# Patient Record
Sex: MALE | Race: WHITE | NOT HISPANIC OR LATINO | Employment: OTHER | URBAN - METROPOLITAN AREA
[De-identification: names, ages, dates, MRNs, and addresses within clinical notes are randomized per-mention and may not be internally consistent; named-entity substitution may affect disease eponyms.]

---

## 2017-06-05 ENCOUNTER — HOSPITAL ENCOUNTER (OUTPATIENT)
Dept: FAMILY MEDICINE CLINIC | Facility: CLINIC | Age: 62
Setting detail: SPECIMEN
Discharge: HOME | End: 2017-06-05
Attending: FAMILY MEDICINE | Admitting: FAMILY MEDICINE

## 2017-06-05 LAB
ALBUMIN SERPL-MCNC: 4.2 G/DL (ref 3.5–4.8)
ALBUMIN/GLOB SERPL: 1.2 {RATIO} (ref 1–1.7)
ALP SERPL-CCNC: 75 IU/L (ref 32–91)
ALT SERPL-CCNC: 26 IU/L (ref 17–63)
ANION GAP SERPL CALC-SCNC: 15.1 MMOL/L (ref 10–20)
AST SERPL-CCNC: 21 IU/L (ref 15–41)
BILIRUB SERPL-MCNC: 0.5 MG/DL (ref 0.3–1.2)
BUN SERPL-MCNC: 18 MG/DL (ref 8–20)
BUN/CREAT SERPL: 15 (ref 6.2–20.3)
CALCIUM SERPL-MCNC: 10.8 MG/DL (ref 8.9–10.3)
CHLORIDE SERPL-SCNC: 102 MMOL/L (ref 101–111)
CHOLEST SERPL-MCNC: 154 MG/DL
CHOLEST/HDLC SERPL: 2.6 {RATIO}
CONV CO2: 24 MMOL/L (ref 22–32)
CONV LDL CHOLESTEROL DIRECT: 77 MG/DL (ref 0–100)
CONV TOTAL PROTEIN: 7.6 G/DL (ref 6.1–7.9)
CREAT UR-MCNC: 1.2 MG/DL (ref 0.7–1.2)
GLOBULIN UR ELPH-MCNC: 3.4 G/DL (ref 2.5–3.8)
GLUCOSE SERPL-MCNC: 121 MG/DL (ref 65–99)
HDLC SERPL-MCNC: 59 MG/DL
LDLC/HDLC SERPL: 1.3 {RATIO}
LIPID INTERPRETATION: NORMAL
POTASSIUM SERPL-SCNC: 4.1 MMOL/L (ref 3.6–5.1)
SODIUM SERPL-SCNC: 137 MMOL/L (ref 136–144)
TRIGL SERPL-MCNC: 129 MG/DL
VLDLC SERPL CALC-MCNC: 18.7 MG/DL

## 2017-12-01 ENCOUNTER — HOSPITAL ENCOUNTER (OUTPATIENT)
Dept: FAMILY MEDICINE CLINIC | Facility: CLINIC | Age: 62
Setting detail: SPECIMEN
Discharge: HOME | End: 2017-12-01
Attending: FAMILY MEDICINE | Admitting: FAMILY MEDICINE

## 2017-12-01 LAB
ALBUMIN SERPL-MCNC: 4.3 G/DL (ref 3.5–4.8)
ALBUMIN/GLOB SERPL: 1.4 {RATIO} (ref 1–1.7)
ALP SERPL-CCNC: 87 IU/L (ref 32–91)
ALT SERPL-CCNC: 27 IU/L (ref 17–63)
ANION GAP SERPL CALC-SCNC: 13.4 MMOL/L (ref 10–20)
AST SERPL-CCNC: 26 IU/L (ref 15–41)
BASOPHILS # BLD AUTO: 0.1 10*3/UL (ref 0–0.2)
BASOPHILS NFR BLD AUTO: 1 % (ref 0–2)
BILIRUB SERPL-MCNC: 0.7 MG/DL (ref 0.3–1.2)
BUN SERPL-MCNC: 19 MG/DL (ref 8–20)
BUN/CREAT SERPL: 14.6 (ref 6.2–20.3)
CALCIUM SERPL-MCNC: 9.7 MG/DL (ref 8.9–10.3)
CHLORIDE SERPL-SCNC: 100 MMOL/L (ref 101–111)
CHOLEST SERPL-MCNC: 143 MG/DL
CHOLEST/HDLC SERPL: 2.6 {RATIO}
CONV CO2: 23 MMOL/L (ref 22–32)
CONV LDL CHOLESTEROL DIRECT: 74 MG/DL (ref 0–100)
CONV TOTAL PROTEIN: 7.4 G/DL (ref 6.1–7.9)
CREAT UR-MCNC: 1.3 MG/DL (ref 0.7–1.2)
DIFFERENTIAL METHOD BLD: (no result)
EOSINOPHIL # BLD AUTO: 0.1 10*3/UL (ref 0–0.3)
EOSINOPHIL # BLD AUTO: 1 % (ref 0–3)
ERYTHROCYTE [DISTWIDTH] IN BLOOD BY AUTOMATED COUNT: 15.4 % (ref 11.5–14.5)
GLOBULIN UR ELPH-MCNC: 3.1 G/DL (ref 2.5–3.8)
GLUCOSE SERPL-MCNC: 95 MG/DL (ref 65–99)
HCT VFR BLD AUTO: 41.2 % (ref 40–54)
HDLC SERPL-MCNC: 54 MG/DL
HGB BLD-MCNC: 13.4 G/DL (ref 14–18)
LDLC/HDLC SERPL: 1.4 {RATIO}
LIPID INTERPRETATION: NORMAL
LYMPHOCYTES # BLD AUTO: 2 10*3/UL (ref 0.8–4.8)
LYMPHOCYTES NFR BLD AUTO: 15 % (ref 18–42)
MCH RBC QN AUTO: 28.1 PG (ref 26–32)
MCHC RBC AUTO-ENTMCNC: 32.4 G/DL (ref 32–36)
MCV RBC AUTO: 86.5 FL (ref 80–94)
MONOCYTES # BLD AUTO: 1 10*3/UL (ref 0.1–1.3)
MONOCYTES NFR BLD AUTO: 8 % (ref 2–11)
NEUTROPHILS # BLD AUTO: 9.7 10*3/UL (ref 2.3–8.6)
NEUTROPHILS NFR BLD AUTO: 75 % (ref 50–75)
NRBC BLD AUTO-RTO: 0 /100{WBCS}
NRBC/RBC NFR BLD MANUAL: 0 10*3/UL
PHOSPHATE SERPL-MCNC: 3.1 MG/DL (ref 2.4–4.7)
PLATELET # BLD AUTO: 309 10*3/UL (ref 150–450)
PMV BLD AUTO: 8.7 FL (ref 7.4–10.4)
POTASSIUM SERPL-SCNC: 4.4 MMOL/L (ref 3.6–5.1)
PSA SERPL-MCNC: 2.14 NG/ML (ref 0–4)
RBC # BLD AUTO: 4.77 10*6/UL (ref 4.6–6)
SODIUM SERPL-SCNC: 132 MMOL/L (ref 136–144)
TRIGL SERPL-MCNC: 80 MG/DL
VLDLC SERPL CALC-MCNC: 14.8 MG/DL
WBC # BLD AUTO: 12.9 10*3/UL (ref 4.5–11.5)

## 2018-06-01 ENCOUNTER — HOSPITAL ENCOUNTER (OUTPATIENT)
Dept: FAMILY MEDICINE CLINIC | Facility: CLINIC | Age: 63
Setting detail: SPECIMEN
Discharge: HOME | End: 2018-06-01
Attending: FAMILY MEDICINE | Admitting: FAMILY MEDICINE

## 2018-06-01 LAB
ALBUMIN SERPL-MCNC: 4.2 G/DL (ref 3.5–4.8)
ALBUMIN/GLOB SERPL: 1.3 {RATIO} (ref 1–1.7)
ALP SERPL-CCNC: 97 IU/L (ref 32–91)
ALT SERPL-CCNC: 25 IU/L (ref 17–63)
ANION GAP SERPL CALC-SCNC: 16.2 MMOL/L (ref 10–20)
AST SERPL-CCNC: 25 IU/L (ref 15–41)
BASOPHILS # BLD AUTO: 0.1 10*3/UL (ref 0–0.2)
BASOPHILS NFR BLD AUTO: 1 % (ref 0–2)
BILIRUB SERPL-MCNC: 0.9 MG/DL (ref 0.3–1.2)
BILIRUB UR QL STRIP: NEGATIVE MG/DL
BUN SERPL-MCNC: 18 MG/DL (ref 8–20)
BUN/CREAT SERPL: 12.9 (ref 6.2–20.3)
CALCIUM SERPL-MCNC: 9.9 MG/DL (ref 8.9–10.3)
CASTS URNS QL MICRO: ABNORMAL /[LPF]
CHLORIDE SERPL-SCNC: 100 MMOL/L (ref 101–111)
CHOLEST SERPL-MCNC: 157 MG/DL
CHOLEST/HDLC SERPL: 3 {RATIO}
COLOR UR: YELLOW
CONV BACTERIA IN URINE MICRO: NEGATIVE
CONV CLARITY OF URINE: CLEAR
CONV CO2: 22 MMOL/L (ref 22–32)
CONV HYALINE CASTS IN URINE MICRO: 4 /[LPF] (ref 0–5)
CONV LDL CHOLESTEROL DIRECT: 83 MG/DL (ref 0–100)
CONV MICROALBUM.,U,RANDOM: 8 MG/L
CONV PROTEIN IN URINE BY AUTOMATED TEST STRIP: NEGATIVE MG/DL
CONV SMALL ROUND CELLS: ABNORMAL /[HPF]
CONV TOTAL PROTEIN: 7.4 G/DL (ref 6.1–7.9)
CONV UROBILINOGEN IN URINE BY AUTOMATED TEST STRIP: 0.2 MG/DL
CREAT 24H UR-MCNC: 249.9 MG/DL
CREAT UR-MCNC: 1.4 MG/DL (ref 0.7–1.2)
CULTURE INDICATED?: ABNORMAL
DIFFERENTIAL METHOD BLD: (no result)
EOSINOPHIL # BLD AUTO: 0.1 10*3/UL (ref 0–0.3)
EOSINOPHIL # BLD AUTO: 1 % (ref 0–3)
ERYTHROCYTE [DISTWIDTH] IN BLOOD BY AUTOMATED COUNT: 15.2 % (ref 11.5–14.5)
GLOBULIN UR ELPH-MCNC: 3.2 G/DL (ref 2.5–3.8)
GLUCOSE SERPL-MCNC: 116 MG/DL (ref 65–99)
GLUCOSE UR QL: NEGATIVE MG/DL
HCT VFR BLD AUTO: 44.8 % (ref 40–54)
HDLC SERPL-MCNC: 53 MG/DL
HGB BLD-MCNC: 14.6 G/DL (ref 14–18)
HGB UR QL STRIP: NEGATIVE
KETONES UR QL STRIP: NEGATIVE MG/DL
LDLC/HDLC SERPL: 1.6 {RATIO}
LEUKOCYTE ESTERASE UR QL STRIP: ABNORMAL
LIPID INTERPRETATION: ABNORMAL
LYMPHOCYTES # BLD AUTO: 1.7 10*3/UL (ref 0.8–4.8)
LYMPHOCYTES NFR BLD AUTO: 15 % (ref 18–42)
MCH RBC QN AUTO: 27.8 PG (ref 26–32)
MCHC RBC AUTO-ENTMCNC: 32.6 G/DL (ref 32–36)
MCV RBC AUTO: 85.2 FL (ref 80–94)
MICROALBUMIN/CREAT UR: 3.2 UG/MG
MONOCYTES # BLD AUTO: 1 10*3/UL (ref 0.1–1.3)
MONOCYTES NFR BLD AUTO: 9 % (ref 2–11)
NEUTROPHILS # BLD AUTO: 8.7 10*3/UL (ref 2.3–8.6)
NEUTROPHILS NFR BLD AUTO: 74 % (ref 50–75)
NITRITE UR QL STRIP: NEGATIVE
NRBC BLD AUTO-RTO: 0 /100{WBCS}
NRBC/RBC NFR BLD MANUAL: 0 10*3/UL
PH UR STRIP.AUTO: 6.5 [PH] (ref 4.5–8)
PLATELET # BLD AUTO: 308 10*3/UL (ref 150–450)
PMV BLD AUTO: 8.9 FL (ref 7.4–10.4)
POTASSIUM SERPL-SCNC: 4.2 MMOL/L (ref 3.6–5.1)
RBC # BLD AUTO: 5.26 10*6/UL (ref 4.6–6)
RBC #/AREA URNS HPF: 1 /[HPF] (ref 0–3)
SODIUM SERPL-SCNC: 134 MMOL/L (ref 136–144)
SP GR UR: 1.02 (ref 1–1.03)
SPERM URNS QL MICRO: ABNORMAL /[HPF]
SQUAMOUS SPT QL MICRO: 1 /[HPF] (ref 0–5)
TRIGL SERPL-MCNC: 109 MG/DL
UNIDENT CRYS URNS QL MICRO: ABNORMAL /[HPF]
VLDLC SERPL CALC-MCNC: 21.2 MG/DL
WBC # BLD AUTO: 11.5 10*3/UL (ref 4.5–11.5)
WBC #/AREA URNS HPF: 3 /[HPF] (ref 0–5)
YEAST SPEC QL WET PREP: ABNORMAL /[HPF]

## 2018-06-12 ENCOUNTER — HOSPITAL ENCOUNTER (OUTPATIENT)
Dept: CARDIOLOGY | Facility: HOSPITAL | Age: 63
Discharge: HOME | End: 2018-06-12
Attending: SURGERY | Admitting: SURGERY

## 2018-07-17 ENCOUNTER — HOSPITAL ENCOUNTER (OUTPATIENT)
Dept: OTHER | Facility: HOSPITAL | Age: 63
Discharge: HOME | End: 2018-07-17
Attending: SURGERY | Admitting: SURGERY

## 2018-07-17 LAB
ANION GAP SERPL CALC-SCNC: 12 MMOL/L (ref 10–20)
BACTERIA SPEC AEROBE CULT: NORMAL
BASOPHILS # BLD AUTO: 0 10*3/UL (ref 0–0.2)
BASOPHILS NFR BLD AUTO: 1 % (ref 0–2)
BUN SERPL-MCNC: 11 MG/DL (ref 8–20)
BUN/CREAT SERPL: 9.2 (ref 6.2–20.3)
CALCIUM SERPL-MCNC: 9.5 MG/DL (ref 8.9–10.3)
CHLORIDE SERPL-SCNC: 103 MMOL/L (ref 101–111)
CONV CO2: 24 MMOL/L (ref 22–32)
CREAT UR-MCNC: 1.2 MG/DL (ref 0.7–1.2)
DIFFERENTIAL METHOD BLD: (no result)
EOSINOPHIL # BLD AUTO: 0.2 10*3/UL (ref 0–0.3)
EOSINOPHIL # BLD AUTO: 2 % (ref 0–3)
ERYTHROCYTE [DISTWIDTH] IN BLOOD BY AUTOMATED COUNT: 15.3 % (ref 11.5–14.5)
GLUCOSE SERPL-MCNC: 100 MG/DL (ref 65–99)
HCT VFR BLD AUTO: 42.8 % (ref 40–54)
HGB BLD-MCNC: 13.9 G/DL (ref 14–18)
LYMPHOCYTES # BLD AUTO: 1.7 10*3/UL (ref 0.8–4.8)
LYMPHOCYTES NFR BLD AUTO: 15 % (ref 18–42)
Lab: NORMAL
MCH RBC QN AUTO: 27.6 PG (ref 26–32)
MCHC RBC AUTO-ENTMCNC: 32.6 G/DL (ref 32–36)
MCV RBC AUTO: 84.9 FL (ref 80–94)
MICRO REPORT STATUS: NORMAL
MONOCYTES # BLD AUTO: 1.1 10*3/UL (ref 0.1–1.3)
MONOCYTES NFR BLD AUTO: 10 % (ref 2–11)
NEUTROPHILS # BLD AUTO: 7.9 10*3/UL (ref 2.3–8.6)
NEUTROPHILS NFR BLD AUTO: 72 % (ref 50–75)
NRBC BLD AUTO-RTO: 0 /100{WBCS}
NRBC/RBC NFR BLD MANUAL: 0 10*3/UL
PLATELET # BLD AUTO: 307 10*3/UL (ref 150–450)
PMV BLD AUTO: 8.4 FL (ref 7.4–10.4)
POTASSIUM SERPL-SCNC: 4 MMOL/L (ref 3.6–5.1)
RBC # BLD AUTO: 5.04 10*6/UL (ref 4.6–6)
SODIUM SERPL-SCNC: 135 MMOL/L (ref 136–144)
SPECIMEN SOURCE: NORMAL
WBC # BLD AUTO: 10.8 10*3/UL (ref 4.5–11.5)

## 2018-07-19 ENCOUNTER — HOSPITAL ENCOUNTER (OUTPATIENT)
Dept: PREOP | Facility: HOSPITAL | Age: 63
Setting detail: HOSPITAL OUTPATIENT SURGERY
Discharge: HOME | End: 2018-07-19
Attending: SURGERY | Admitting: SURGERY

## 2018-07-19 LAB
GLUCOSE BLD-MCNC: 122 MG/DL (ref 70–105)
GLUCOSE BLD-MCNC: 126 MG/DL (ref 70–105)

## 2018-12-12 ENCOUNTER — HOSPITAL ENCOUNTER (OUTPATIENT)
Dept: FAMILY MEDICINE CLINIC | Facility: CLINIC | Age: 63
Setting detail: SPECIMEN
Discharge: HOME | End: 2018-12-12
Attending: FAMILY MEDICINE | Admitting: FAMILY MEDICINE

## 2018-12-12 LAB
ALBUMIN SERPL-MCNC: 4 G/DL (ref 3.5–4.8)
ALBUMIN/GLOB SERPL: 1.1 {RATIO} (ref 1–1.7)
ALP SERPL-CCNC: 96 IU/L (ref 32–91)
ALT SERPL-CCNC: 30 IU/L (ref 17–63)
ANION GAP SERPL CALC-SCNC: 14.4 MMOL/L (ref 10–20)
AST SERPL-CCNC: 25 IU/L (ref 15–41)
BASOPHILS # BLD AUTO: 0 10*3/UL (ref 0–0.2)
BASOPHILS NFR BLD AUTO: 1 % (ref 0–2)
BILIRUB SERPL-MCNC: 0.5 MG/DL (ref 0.3–1.2)
BILIRUB UR QL STRIP: NEGATIVE MG/DL
BUN SERPL-MCNC: 27 MG/DL (ref 8–20)
BUN/CREAT SERPL: 18 (ref 6.2–20.3)
CALCIUM SERPL-MCNC: 10.1 MG/DL (ref 8.9–10.3)
CASTS URNS QL MICRO: NORMAL /[LPF]
CHLORIDE SERPL-SCNC: 101 MMOL/L (ref 101–111)
CHOLEST SERPL-MCNC: 139 MG/DL
CHOLEST/HDLC SERPL: 2.4 {RATIO}
COLOR UR: YELLOW
CONV BACTERIA IN URINE MICRO: NEGATIVE
CONV CLARITY OF URINE: CLEAR
CONV CO2: 22 MMOL/L (ref 22–32)
CONV HYALINE CASTS IN URINE MICRO: 0 /[LPF] (ref 0–5)
CONV LDL CHOLESTEROL DIRECT: 72 MG/DL (ref 0–100)
CONV PROTEIN IN URINE BY AUTOMATED TEST STRIP: NEGATIVE MG/DL
CONV SMALL ROUND CELLS: NORMAL /[HPF]
CONV TOTAL PROTEIN: 7.6 G/DL (ref 6.1–7.9)
CONV UROBILINOGEN IN URINE BY AUTOMATED TEST STRIP: 0.2 MG/DL
CREAT UR-MCNC: 1.5 MG/DL (ref 0.7–1.2)
CULTURE INDICATED?: NORMAL
DIFFERENTIAL METHOD BLD: (no result)
EOSINOPHIL # BLD AUTO: 0.1 10*3/UL (ref 0–0.3)
EOSINOPHIL # BLD AUTO: 2 % (ref 0–3)
ERYTHROCYTE [DISTWIDTH] IN BLOOD BY AUTOMATED COUNT: 14.6 % (ref 11.5–14.5)
GLOBULIN UR ELPH-MCNC: 3.6 G/DL (ref 2.5–3.8)
GLUCOSE SERPL-MCNC: 110 MG/DL (ref 65–99)
GLUCOSE UR QL: NEGATIVE MG/DL
HBA1C MFR BLD: 6.2 % (ref 0–5.6)
HCT VFR BLD AUTO: 43.2 % (ref 40–54)
HDLC SERPL-MCNC: 57 MG/DL
HGB BLD-MCNC: 13.9 G/DL (ref 14–18)
HGB UR QL STRIP: NEGATIVE
KETONES UR QL STRIP: NEGATIVE MG/DL
LDLC/HDLC SERPL: 1.3 {RATIO}
LEUKOCYTE ESTERASE UR QL STRIP: NEGATIVE
LIPID INTERPRETATION: NORMAL
LYMPHOCYTES # BLD AUTO: 1.5 10*3/UL (ref 0.8–4.8)
LYMPHOCYTES NFR BLD AUTO: 16 % (ref 18–42)
MCH RBC QN AUTO: 27.3 PG (ref 26–32)
MCHC RBC AUTO-ENTMCNC: 32.1 G/DL (ref 32–36)
MCV RBC AUTO: 85 FL (ref 80–94)
MONOCYTES # BLD AUTO: 0.8 10*3/UL (ref 0.1–1.3)
MONOCYTES NFR BLD AUTO: 9 % (ref 2–11)
NEUTROPHILS # BLD AUTO: 6.6 10*3/UL (ref 2.3–8.6)
NEUTROPHILS NFR BLD AUTO: 72 % (ref 50–75)
NITRITE UR QL STRIP: NEGATIVE
NRBC BLD AUTO-RTO: 0 /100{WBCS}
NRBC/RBC NFR BLD MANUAL: 0 10*3/UL
PH UR STRIP.AUTO: 6 [PH] (ref 4.5–8)
PLATELET # BLD AUTO: 293 10*3/UL (ref 150–450)
PMV BLD AUTO: 9.1 FL (ref 7.4–10.4)
POTASSIUM SERPL-SCNC: 4.4 MMOL/L (ref 3.6–5.1)
PSA SERPL-MCNC: 2.83 NG/ML (ref 0–4)
RBC # BLD AUTO: 5.08 10*6/UL (ref 4.6–6)
RBC #/AREA URNS HPF: 1 /[HPF] (ref 0–3)
SODIUM SERPL-SCNC: 133 MMOL/L (ref 136–144)
SP GR UR: 1.02 (ref 1–1.03)
SPERM URNS QL MICRO: NORMAL /[HPF]
SQUAMOUS SPT QL MICRO: 0 /[HPF] (ref 0–5)
TRIGL SERPL-MCNC: 95 MG/DL
UNIDENT CRYS URNS QL MICRO: NORMAL /[HPF]
VLDLC SERPL CALC-MCNC: 9.7 MG/DL
WBC # BLD AUTO: 9.2 10*3/UL (ref 4.5–11.5)
WBC #/AREA URNS HPF: 3 /[HPF] (ref 0–5)
YEAST SPEC QL WET PREP: NORMAL /[HPF]

## 2019-06-10 ENCOUNTER — HOSPITAL ENCOUNTER (OUTPATIENT)
Dept: FAMILY MEDICINE CLINIC | Facility: CLINIC | Age: 64
Setting detail: SPECIMEN
Discharge: HOME | End: 2019-06-10
Attending: FAMILY MEDICINE | Admitting: FAMILY MEDICINE

## 2019-06-10 ENCOUNTER — CONVERSION ENCOUNTER (OUTPATIENT)
Dept: FAMILY MEDICINE CLINIC | Facility: CLINIC | Age: 64
End: 2019-06-10

## 2019-06-10 LAB
ALBUMIN SERPL-MCNC: 4.3 G/DL (ref 3.5–4.8)
ALBUMIN/GLOB SERPL: 1.3 {RATIO} (ref 1–1.7)
ALP SERPL-CCNC: 88 IU/L (ref 32–91)
ALT SERPL-CCNC: 23 IU/L (ref 17–63)
ANION GAP SERPL CALC-SCNC: 17.2 MMOL/L (ref 10–20)
AST SERPL-CCNC: 22 IU/L (ref 15–41)
BASOPHILS # BLD AUTO: 0.1 10*3/UL (ref 0–0.2)
BASOPHILS NFR BLD AUTO: 1 % (ref 0–2)
BILIRUB SERPL-MCNC: 0.6 MG/DL (ref 0.3–1.2)
BUN SERPL-MCNC: 13 MG/DL (ref 8–20)
BUN/CREAT SERPL: 8.7 (ref 6.2–20.3)
CALCIUM SERPL-MCNC: 9.7 MG/DL (ref 8.9–10.3)
CHLORIDE SERPL-SCNC: 102 MMOL/L (ref 101–111)
CHOLEST SERPL-MCNC: 158 MG/DL
CHOLEST/HDLC SERPL: 2.4 {RATIO}
CONV CO2: 19 MMOL/L (ref 22–32)
CONV LDL CHOLESTEROL DIRECT: 93 MG/DL (ref 0–100)
CONV TOTAL PROTEIN: 7.7 G/DL (ref 6.1–7.9)
CREAT UR-MCNC: 1.5 MG/DL (ref 0.7–1.2)
DIFFERENTIAL METHOD BLD: (no result)
EOSINOPHIL # BLD AUTO: 0.2 10*3/UL (ref 0–0.3)
EOSINOPHIL # BLD AUTO: 2 % (ref 0–3)
ERYTHROCYTE [DISTWIDTH] IN BLOOD BY AUTOMATED COUNT: 15.8 % (ref 11.5–14.5)
GLOBULIN UR ELPH-MCNC: 3.4 G/DL (ref 2.5–3.8)
GLUCOSE SERPL-MCNC: 117 MG/DL (ref 65–99)
HBA1C MFR BLD: 6.1 % (ref 0–5.6)
HCT VFR BLD AUTO: 44.4 % (ref 40–54)
HDLC SERPL-MCNC: 65 MG/DL
HGB BLD-MCNC: 14.4 G/DL (ref 14–18)
LDLC/HDLC SERPL: 1.4 {RATIO}
LIPID INTERPRETATION: NORMAL
LYMPHOCYTES # BLD AUTO: 1.5 10*3/UL (ref 0.8–4.8)
LYMPHOCYTES NFR BLD AUTO: 14 % (ref 18–42)
MCH RBC QN AUTO: 27.5 PG (ref 26–32)
MCHC RBC AUTO-ENTMCNC: 32.3 G/DL (ref 32–36)
MCV RBC AUTO: 84.9 FL (ref 80–94)
MONOCYTES # BLD AUTO: 0.9 10*3/UL (ref 0.1–1.3)
MONOCYTES NFR BLD AUTO: 9 % (ref 2–11)
NEUTROPHILS # BLD AUTO: 8.1 10*3/UL (ref 2.3–8.6)
NEUTROPHILS NFR BLD AUTO: 74 % (ref 50–75)
NRBC BLD AUTO-RTO: 0 /100{WBCS}
NRBC/RBC NFR BLD MANUAL: 0 10*3/UL
PLATELET # BLD AUTO: 336 10*3/UL (ref 150–450)
PMV BLD AUTO: 9 FL (ref 7.4–10.4)
POTASSIUM SERPL-SCNC: 4.2 MMOL/L (ref 3.6–5.1)
PSA SERPL-MCNC: 2.1 NG/ML (ref 0–4)
RBC # BLD AUTO: 5.23 10*6/UL (ref 4.6–6)
SODIUM SERPL-SCNC: 134 MMOL/L (ref 136–144)
TRIGL SERPL-MCNC: 115 MG/DL
VLDLC SERPL CALC-MCNC: 0 MG/DL
WBC # BLD AUTO: 10.7 10*3/UL (ref 4.5–11.5)

## 2019-06-12 VITALS
BODY MASS INDEX: 27.41 KG/M2 | WEIGHT: 191 LBS | HEART RATE: 104 BPM | OXYGEN SATURATION: 98 % | DIASTOLIC BLOOD PRESSURE: 81 MMHG | SYSTOLIC BLOOD PRESSURE: 130 MMHG | RESPIRATION RATE: 16 BRPM

## 2019-06-13 NOTE — PROGRESS NOTES
Visit Type:  Annual Physical  Referring Provider:  Smiley Lu MD  Primary Provider:  Smiley Lu MD    CC:  well exam.    History of Present Illness:                 This is 64 y/o male who is coming in today for his annual check up. He reports doing well overall. He sometimes gets dizzy when he bends over, but that does not happen very often, but he is concerned that his BP might be dropping at times, but   he does not really check it. Patient denies any chest pain, shortness of breath, dizziness, headaches, nausea, vomiting, or diarrhea. Patient denies any visual problems, weakness, numbness, or tingling. No swelling reported. Patient has good appetite and   denies any weight loss. No rash reported. He denies any urinary issues.      Past Medical History:     Asthma     Hyperlipidemia     Hypertension     Impaired fasting glucose     Vitamin D deficiency     Depression     Colonoscopy in [facility] 2008     Pneumovax in 2012     Prevnar in 2015     Tdap in2015     Zostavax in 10/2017 - given in the pharmacy     Hiatal Hernia/GERD     Cardiomegaly - NL ECHO in 2018                         Eye - Dr. Valle    Past Surgical History:     Reviewed history from 2018 and no changes required:        Vasectomy        Tonsillectomy        Umbilical hernia repair 2018 - Dr. Hernandez    Family History Summary:      Reviewed history Last on 2018 and no changes required:06/10/2019  Father - Has Family History of Heart Disease - he  of heart attack at the age of 72 - Entered On: 2014  Mother - Has Family History of Hypertension - Entered On: 2014  Mother - Has Family History of Heart Disease - Entered On: 2014  Sister - Has Family History of Diabetes - Entered On: 2014  Mother - Has Family History of Diabetes - Entered On: 2014    General Comments - FH:  FH Diabetes      Social History:     Reviewed history from 2018 and no changes required:        Patient has  never smoked.        Passive Smoke: N        Alcohol Use: N        Drug Use: N        HIV/High Risk: N                Risk Factors:     Smoked Tobacco Use:  Never smoker  Smokeless Tobacco Use:  Never  Passive smoke exposure:  no  Drug use:  no  Alcohol use:  no  Seatbelt use:  100 %  Sun Exposure:  occasionally      Review of Systems     General       Denies fever, chills and fatigue.    Eyes       Denies double vision, eye irritation, vision loss - both eyes and blurring.    ENT       Denies earache, nasal congestion, sore throat, Post-nasal drainage and sneezing.    CV       Denies near fainting, chest pain or discomfort, racing/skipping heart beats, lightheadedness, shortness of breath with exertion, palpitations and swelling of hands or feet.    Resp       Denies cough, shortness of breath, chest discomfort and wheezing.    GI       Denies indigestion, nausea, vomiting, abdominal pain and diarrhea.           Denies dysuria and urinary frequency.    MS       Complains of joint pain, arthritis and decreased ROM.       Denies joint swelling, back pain and stiffness.    Derm       Denies dryness, itching and rash.    Neuro       Denies poor balance, headaches, numbness, tingling and weakness.    Psych       Denies anxiety, mental problems, depression, easily irritated and frequent crying.    Endo       Denies cold intolerance, heat intolerance, excessive urination and excessive   thirst.    Heme       Denies bleeding, skin discoloration and fevers.    Allergy       Denies hives or rash and seasonal allergies.      Vital Signs:    Patient Profile:    63 Years Old Male  Height:     70 inches (177.80 cm)  Weight:     191 pounds  BMI:        27.40     O2 Sat:     98 %  Temp:       97.7 degrees F oral  Pulse rate: 104 / minute  Pulse rhythm:   regular  Resp:       16 per minute  BP Sittin / 81  (left arm)    Cuff size:  regular      Problems: Active problems were reviewed with the patient during this  visit.  Medications: Medications were reviewed with the patient during this visit.  Allergies: Allergies were reviewed with the patient during this visit.  No Known Allergy.        Vitals Entered By: Smiley Lu MD (Faiza 10, 2019 8:00 AM)      Physical Exam    General:      well developed, well nourished, in no acute distress.    Head:      normocephalic and atraumatic.    Eyes:      PERRL/EOM intact, conjunctiva and sclera clear with out nystagmus.    Ears:      TM's intact and clear with normal canals with grossly normal hearing.    Mouth:      no deformity or lesions with good dentition.    Neck:      no masses, thyromegaly, or abnormal cervical nodes.    Lungs:      clear to auscultation bilaterally. No crackles or wheezes noted, good air movement. No retractions or accessory muscle use.    Heart:      non-displaced PMI, chest non-tender; regular rate and rhythm, S1, S2 without murmurs, rubs, or gallops  Abdomen:      soft, non-tender, non-distended, positive bowel sounds. No rebound tenderness, no guarding. No organomegaly noted. Normal percussion.    Msk:      no deformity or scoliosis noted of thoracic or lumbar spine.    Extremities:      no pedal edema.    Neurologic:      CN II-XII gross intact.    Skin:      intact without lesions or rashes.    Cervical Nodes:      no significant adenopathy.    Psych:      alert and cooperative; normal mood and affect; normal attention span and concentration.        Blood Pressure:  Today's BP: 130/81 mm Hg    Labwork:   Most Recent Lab Results:   LDL: 72 mg/dL 12/12/2018  HbA1c: : 6.2 % 12/12/2018    Active Medications (reviewed today):  METFORMIN HCL  MG ORAL TABLET EXTENDED RELEASE 24 HOUR (METFORMIN HCL) Take one (1) tablet by mouth twice a day  VITAMIN D 2000 UNIT ORAL CAPSULE (CHOLECALCIFEROL) Take 1 tablet by mouth daily  HYDROCHLOROTHIAZIDE 12.5 MG ORAL CAPSULE (HYDROCHLOROTHIAZIDE) TAKE 1 CAPSULE BY MOUTH DAILY  AMLODIPINE BESYLATE 10MG TABLETS  (AMLODIPINE BESYLATE) TAKE 1 TABLET BY MOUTH DAILY  ASPIR-LOW 81 MG ORAL TABLET DELAYED RELEASE (ASPIRIN) Take 1 tablet by mouth daily  PROAIR  (90 Base) MCG/ACT INHALATION AEROSOL SOLUTION (ALBUTEROL SULFATE) use 1-2 puffs q 4-6 hours as needed for SOA  LISINOPRIL 40MG TABLETS (LISINOPRIL) TAKE 1 TABLET BY MOUTH DAILY  ATORVASTATIN CALCIUM 40 MG ORAL TABLET (ATORVASTATIN CALCIUM) TAKE 1 TABLET BY MOUTH DAILY AT BEDTIME    Current Allergies (reviewed today):  No known allergies        Impression & Recommendations:    Problem # 1:  PREVENTIVE HEALTH CARE (ICD-V70.0) (QJB80-Z67.00)  Assessment: Unchanged  Annual wellness exam was done today - see above for details. I will get labs. He is overdue for colonoscopy and he was once again advised to schedule the test. He was advised to stop his HCTZ for now and monitor his BP at home. Healthy life style was   discussed and encouraged today. Patient was encouraged to exercise regularly and increase PO water intake for good health. Patient also advised to take multivitamin a day.    Orders:  [facility] PSA TOTAL (SCREENING) (PSA)  [facility] CBC W/DIFF; PATH REVIEW IF INDICATED (CBC)  [facility] LIPID PANEL (LIPID)  [facility] COMPREHENSIVE METABOLIC PANEL (CMP) (MPC)  Est. Well Exam 40-64 yrs. (35030)      Medications Added to Medication List This Visit:  1)  Diclofenac Sodium 75 Mg Oral Tablet Delayed Release (Diclofenac sodium) .... Take one (1) tablet by mouth twice a day as needed for pain    Other Orders:  [facility] HEMOGLOBIN A1c (A1DCA)        Patient Instructions:  1)  Please schedule a follow-up appointment in 1 year.  2)  The importance of keeping the blood pressure at or below 130/80 to prevent stroke, heart attacks, kidney failure, blindness, and loss of limbs was reviewed.          Medications:  DICLOFENAC SODIUM 75 MG ORAL TABLET DELAYED RELEASE (DICLOFENAC SODIUM) Take one (1) tablet by mouth twice a day as needed for pain  #30[Tablet] x 0      Entered and Authorized by:  Smiley  Aly VELEZ      Electronically signed by:   Smiley Lu MD on 06/10/2019      Method used:    Electronically to               [id number]* ([facility])              [address]              [place], [place]  [id number]              Ph: [phone]              Fax: [phone]      Note to Pharmacy: Route: ORAL;       RxID:   [id number]  METFORMIN HCL  MG ORAL TABLET EXTENDED RELEASE 24 HOUR (METFORMIN HCL) Take one (1) tablet by mouth twice a day  #180[Tablet] x 1      Entered and Authorized by:  Smiley Lu MD      Electronically signed by:   Smiley Lu MD on 06/10/2019      Method used:    Electronically to               [id number]* ([facility])              [address]              [facility] [place]  [id number]              Ph: [phone]              Fax: [phone]      RxID:   [id number]  ATORVASTATIN CALCIUM 40 MG ORAL TABLET (ATORVASTATIN CALCIUM) TAKE 1 TABLET BY MOUTH DAILY AT BEDTIME  #90[Tablet] x 1      Entered and Authorized by:  Smiley Lu MD      Electronically signed by:   Smiley Lu MD on 06/10/2019      Method used:    Electronically to               [id number]* ([facility])              [address]              [facility] [place]  [id number]              Ph: [phone]              Fax: [phone]      RxID:   [id number]  LISINOPRIL 40MG TABLETS (LISINOPRIL) TAKE 1 TABLET BY MOUTH DAILY  #90[Tablet] x 1      Entered and Authorized by:  Smiley Lu MD      Electronically signed by:   Smiley uL MD on 06/10/2019      Method used:    Electronically to               [id number]* ([facility])              [address]              [facility] [place]  [id number]              Ph: [phone]              Fax: [phone]      RxID:   [id number]  AMLODIPINE BESYLATE 10MG TABLETS (AMLODIPINE BESYLATE) TAKE 1 TABLET BY  MOUTH DAILY  #90[Tablet] x 1      Entered and Authorized by:  Smiley Lu MD      Electronically signed by:   Smiley Lu MD on 06/10/2019      Method used:    Electronically to               [id number]* (retail)              [address]              [place]  [id number]              Ph: [phone]              Fax: [phone]      RxID:   [id number]                Medication Administration    Orders Added:  1)  [facility] HEMOGLOBIN A1c [A1DCA]  2)  [facility] PSA TOTAL (SCREENING) [PSA]  3)  [facility] CBC W/DIFF; PATH REVIEW IF INDICATED [CBC]  4)  [facility] LIPID PANEL [LIPID]  5)  [facility] COMPREHENSIVE METABOLIC PANEL (CMP) [MPC]  6)  Est. Well Exam 40-64 yrs. [[id number]]        Electronically signed by Smiley Lu MD on 06/10/2019 at 5:19 PM  ________________________________________________________________________       Disclaimer: Converted Note message may not contain all data elements that existed in the legacy source system. Please see [facility] for the original note details.

## 2019-12-05 PROBLEM — I10 HYPERTENSION: Status: ACTIVE | Noted: 2019-12-05

## 2019-12-05 PROBLEM — E87.1 HYPONATREMIA: Status: ACTIVE | Noted: 2017-12-05

## 2019-12-05 PROBLEM — R73.03 PREDIABETES: Status: ACTIVE | Noted: 2019-06-10

## 2019-12-05 PROBLEM — E83.52 HYPERCALCEMIA: Status: ACTIVE | Noted: 2017-06-07

## 2019-12-05 PROBLEM — Z00.00 ENCOUNTER FOR GENERAL ADULT MEDICAL EXAMINATION WITHOUT ABNORMAL FINDINGS: Status: ACTIVE | Noted: 2017-12-01

## 2019-12-05 PROBLEM — R97.20 HIGH PROSTATE SPECIFIC ANTIGEN (PSA): Status: ACTIVE | Noted: 2017-12-05

## 2019-12-05 PROBLEM — J45.909 ASTHMA: Status: ACTIVE | Noted: 2019-12-05

## 2019-12-05 PROBLEM — I51.7 CARDIOMEGALY: Status: ACTIVE | Noted: 2018-05-10

## 2019-12-05 PROBLEM — K44.9 HIATAL HERNIA: Status: ACTIVE | Noted: 2018-05-10

## 2019-12-05 PROBLEM — K21.9 GASTROESOPHAGEAL REFLUX DISEASE: Status: ACTIVE | Noted: 2017-06-05

## 2019-12-05 PROBLEM — E78.5 HYPERLIPIDEMIA: Status: ACTIVE | Noted: 2019-12-05

## 2019-12-05 RX ORDER — ALBUTEROL SULFATE 90 UG/1
AEROSOL, METERED RESPIRATORY (INHALATION)
COMMUNITY
Start: 2012-03-09 | End: 2020-06-05 | Stop reason: SDUPTHER

## 2019-12-05 RX ORDER — METFORMIN HYDROCHLORIDE 500 MG/1
TABLET, EXTENDED RELEASE ORAL
COMMUNITY
Start: 2017-06-08 | End: 2021-12-13

## 2019-12-05 RX ORDER — MULTIVIT-MIN/IRON/FOLIC ACID/K 18-600-40
CAPSULE ORAL
COMMUNITY
Start: 2015-06-05

## 2019-12-05 RX ORDER — DICLOFENAC SODIUM 75 MG/1
TABLET, DELAYED RELEASE ORAL EVERY 12 HOURS
COMMUNITY
Start: 2019-06-10 | End: 2019-12-06

## 2019-12-05 RX ORDER — AMLODIPINE BESYLATE 10 MG/1
TABLET ORAL EVERY 24 HOURS
COMMUNITY
Start: 2018-01-29 | End: 2019-12-06 | Stop reason: SDUPTHER

## 2019-12-05 RX ORDER — ASPIRIN 81 MG/1
TABLET ORAL
COMMUNITY
Start: 2012-03-09

## 2019-12-05 RX ORDER — LISINOPRIL 40 MG/1
TABLET ORAL EVERY 24 HOURS
COMMUNITY
Start: 2018-01-29 | End: 2019-12-06 | Stop reason: SDUPTHER

## 2019-12-05 RX ORDER — HYDROCHLOROTHIAZIDE 12.5 MG/1
1 CAPSULE, GELATIN COATED ORAL EVERY 24 HOURS
COMMUNITY
Start: 2017-10-16 | End: 2019-12-06 | Stop reason: SDUPTHER

## 2019-12-05 RX ORDER — ATORVASTATIN CALCIUM 40 MG/1
TABLET, FILM COATED ORAL
COMMUNITY
Start: 2017-10-16 | End: 2020-01-17 | Stop reason: SDUPTHER

## 2019-12-06 ENCOUNTER — OFFICE VISIT (OUTPATIENT)
Dept: FAMILY MEDICINE CLINIC | Facility: CLINIC | Age: 64
End: 2019-12-06

## 2019-12-06 VITALS
RESPIRATION RATE: 16 BRPM | TEMPERATURE: 98.1 F | BODY MASS INDEX: 27.77 KG/M2 | WEIGHT: 194 LBS | SYSTOLIC BLOOD PRESSURE: 152 MMHG | HEART RATE: 81 BPM | DIASTOLIC BLOOD PRESSURE: 77 MMHG | HEIGHT: 70 IN | OXYGEN SATURATION: 95 %

## 2019-12-06 DIAGNOSIS — R73.02 IMPAIRED GLUCOSE TOLERANCE: ICD-10-CM

## 2019-12-06 DIAGNOSIS — E78.2 MIXED HYPERLIPIDEMIA: ICD-10-CM

## 2019-12-06 DIAGNOSIS — Z23 NEED FOR VACCINATION: ICD-10-CM

## 2019-12-06 DIAGNOSIS — E55.9 VITAMIN D DEFICIENCY: ICD-10-CM

## 2019-12-06 DIAGNOSIS — Z11.59 NEED FOR HEPATITIS C SCREENING TEST: ICD-10-CM

## 2019-12-06 DIAGNOSIS — N40.0 BENIGN PROSTATIC HYPERPLASIA WITHOUT LOWER URINARY TRACT SYMPTOMS: ICD-10-CM

## 2019-12-06 DIAGNOSIS — I10 ESSENTIAL HYPERTENSION: Primary | ICD-10-CM

## 2019-12-06 LAB
ALBUMIN SERPL-MCNC: 4.7 G/DL (ref 3.5–5.2)
ALBUMIN/GLOB SERPL: 1.4 G/DL
ALP SERPL-CCNC: 118 U/L (ref 39–117)
ALT SERPL W P-5'-P-CCNC: 25 U/L (ref 1–41)
ANION GAP SERPL CALCULATED.3IONS-SCNC: 12.8 MMOL/L (ref 5–15)
AST SERPL-CCNC: 20 U/L (ref 1–40)
BASOPHILS # BLD AUTO: 0.06 10*3/MM3 (ref 0–0.2)
BASOPHILS NFR BLD AUTO: 0.6 % (ref 0–1.5)
BILIRUB SERPL-MCNC: 0.4 MG/DL (ref 0.2–1.2)
BUN BLD-MCNC: 20 MG/DL (ref 8–23)
BUN/CREAT SERPL: 14.5 (ref 7–25)
CALCIUM SPEC-SCNC: 9.8 MG/DL (ref 8.6–10.5)
CHLORIDE SERPL-SCNC: 97 MMOL/L (ref 98–107)
CHOLEST SERPL-MCNC: 137 MG/DL (ref 0–200)
CO2 SERPL-SCNC: 25.2 MMOL/L (ref 22–29)
CREAT BLD-MCNC: 1.38 MG/DL (ref 0.76–1.27)
DEPRECATED RDW RBC AUTO: 42.8 FL (ref 37–54)
EOSINOPHIL # BLD AUTO: 0.18 10*3/MM3 (ref 0–0.4)
EOSINOPHIL NFR BLD AUTO: 1.9 % (ref 0.3–6.2)
ERYTHROCYTE [DISTWIDTH] IN BLOOD BY AUTOMATED COUNT: 15 % (ref 12.3–15.4)
GFR SERPL CREATININE-BSD FRML MDRD: 52 ML/MIN/1.73
GLOBULIN UR ELPH-MCNC: 3.4 GM/DL
GLUCOSE BLD-MCNC: 112 MG/DL (ref 65–99)
HCT VFR BLD AUTO: 42.7 % (ref 37.5–51)
HCV AB SER DONR QL: NORMAL
HDLC SERPL-MCNC: 55 MG/DL (ref 40–60)
HGB BLD-MCNC: 13.3 G/DL (ref 13–17.7)
IMM GRANULOCYTES # BLD AUTO: 0.04 10*3/MM3 (ref 0–0.05)
IMM GRANULOCYTES NFR BLD AUTO: 0.4 % (ref 0–0.5)
LDLC SERPL CALC-MCNC: 61 MG/DL (ref 0–100)
LDLC/HDLC SERPL: 1.11 {RATIO}
LYMPHOCYTES # BLD AUTO: 1.74 10*3/MM3 (ref 0.7–3.1)
LYMPHOCYTES NFR BLD AUTO: 18 % (ref 19.6–45.3)
MCH RBC QN AUTO: 24.9 PG (ref 26.6–33)
MCHC RBC AUTO-ENTMCNC: 31.1 G/DL (ref 31.5–35.7)
MCV RBC AUTO: 80 FL (ref 79–97)
MONOCYTES # BLD AUTO: 0.98 10*3/MM3 (ref 0.1–0.9)
MONOCYTES NFR BLD AUTO: 10.1 % (ref 5–12)
NEUTROPHILS # BLD AUTO: 6.69 10*3/MM3 (ref 1.7–7)
NEUTROPHILS NFR BLD AUTO: 69 % (ref 42.7–76)
NRBC BLD AUTO-RTO: 0 /100 WBC (ref 0–0.2)
PLATELET # BLD AUTO: 331 10*3/MM3 (ref 140–450)
PMV BLD AUTO: 10.5 FL (ref 6–12)
POTASSIUM BLD-SCNC: 4.2 MMOL/L (ref 3.5–5.2)
PROT SERPL-MCNC: 8.1 G/DL (ref 6–8.5)
RBC # BLD AUTO: 5.34 10*6/MM3 (ref 4.14–5.8)
SODIUM BLD-SCNC: 135 MMOL/L (ref 136–145)
TRIGL SERPL-MCNC: 105 MG/DL (ref 0–150)
TSH SERPL DL<=0.05 MIU/L-ACNC: 2.38 UIU/ML (ref 0.27–4.2)
VLDLC SERPL-MCNC: 21 MG/DL (ref 5–40)
WBC NRBC COR # BLD: 9.69 10*3/MM3 (ref 3.4–10.8)

## 2019-12-06 PROCEDURE — 90732 PPSV23 VACC 2 YRS+ SUBQ/IM: CPT | Performed by: FAMILY MEDICINE

## 2019-12-06 PROCEDURE — G0009 ADMIN PNEUMOCOCCAL VACCINE: HCPCS | Performed by: FAMILY MEDICINE

## 2019-12-06 PROCEDURE — 83036 HEMOGLOBIN GLYCOSYLATED A1C: CPT | Performed by: FAMILY MEDICINE

## 2019-12-06 PROCEDURE — 84443 ASSAY THYROID STIM HORMONE: CPT | Performed by: FAMILY MEDICINE

## 2019-12-06 PROCEDURE — 36415 COLL VENOUS BLD VENIPUNCTURE: CPT | Performed by: FAMILY MEDICINE

## 2019-12-06 PROCEDURE — 99214 OFFICE O/P EST MOD 30 MIN: CPT | Performed by: FAMILY MEDICINE

## 2019-12-06 PROCEDURE — 86803 HEPATITIS C AB TEST: CPT | Performed by: FAMILY MEDICINE

## 2019-12-06 PROCEDURE — 80053 COMPREHEN METABOLIC PANEL: CPT | Performed by: FAMILY MEDICINE

## 2019-12-06 PROCEDURE — 85025 COMPLETE CBC W/AUTO DIFF WBC: CPT | Performed by: FAMILY MEDICINE

## 2019-12-06 PROCEDURE — 80061 LIPID PANEL: CPT | Performed by: FAMILY MEDICINE

## 2019-12-06 RX ORDER — HYDROCHLOROTHIAZIDE 12.5 MG/1
12.5 CAPSULE, GELATIN COATED ORAL EVERY 24 HOURS
Qty: 90 CAPSULE | Refills: 1 | Status: SHIPPED | OUTPATIENT
Start: 2019-12-06 | End: 2020-06-05

## 2019-12-06 RX ORDER — LISINOPRIL 40 MG/1
40 TABLET ORAL EVERY 24 HOURS
Qty: 90 TABLET | Refills: 1 | Status: SHIPPED | OUTPATIENT
Start: 2019-12-06 | End: 2020-06-05 | Stop reason: SDUPTHER

## 2019-12-06 RX ORDER — AMLODIPINE BESYLATE 10 MG/1
10 TABLET ORAL EVERY 24 HOURS
Qty: 90 TABLET | Refills: 1 | Status: SHIPPED | OUTPATIENT
Start: 2019-12-06 | End: 2020-06-05 | Stop reason: SDUPTHER

## 2019-12-06 NOTE — PROGRESS NOTES
Subjective   Chief Complaint   Patient presents with   • Follow-up   • Hypertension   • Hyperlipidemia   • Prediabetes   • Asthma     Antonio De Dios is a 64 y.o. male.     Patient Care Team:  Smiley Lu MD as PCP - General (Family Medicine)    He is coming in today to follow-up on his chronic medical problems.  We are addressing his pre-diabetes, hypertension, hyperlipidemia, asthma, BPH, and vitamin D deficiency.  He reports taking all of his medications as directed and he denies any side effects.  He recently retired, but still working some.  He is trying to work on his diet and staying fairly physically active. Patient denies any chest pain, shortness of breath, dizziness, nausea, vomiting, or diarrhea. No visual issues reported. No headaches, numbness or tingling. No urinary issues. Patient has good appetite and denies any weight changes. No swelling reported. No emotional issues.           The following portions of the patient's history were reviewed and updated as appropriate: allergies, current medications, past family history, past medical history, past social history, past surgical history and problem list.  Past Medical History:   Diagnosis Date   • Asthma    • BPH (benign prostatic hyperplasia)    • Elevated PSA    • GERD (gastroesophageal reflux disease)    • Hyperlipidemia    • Hypertension    • Prediabetes    • Vitamin D deficiency      Past Surgical History:   Procedure Laterality Date   • COLONOSCOPY     • TONSILLECTOMY     • UMBILICAL HERNIA REPAIR  07/2018   • VASECTOMY       The patient has a family history of  Family History   Problem Relation Age of Onset   • Diabetes Mother    • Heart disease Mother    • Hypertension Mother    • Heart disease Father      Social History     Socioeconomic History   • Marital status:      Spouse name: Not on file   • Number of children: Not on file   • Years of education: Not on file   • Highest education level: Not on file   Tobacco Use   • Smoking  "status: Never Smoker   • Smokeless tobacco: Never Used   Substance and Sexual Activity   • Alcohol use: Yes   • Drug use: No   • Sexual activity: Defer       Review of Systems   Constitutional: Negative for activity change, fatigue and fever.   Respiratory: Negative for cough, shortness of breath and wheezing.    Cardiovascular: Negative for chest pain, palpitations and leg swelling.   Gastrointestinal: Negative for constipation, diarrhea and indigestion.   Skin: Negative for color change, dry skin and rash.   Neurological: Negative for tremors and headache.     Visit Vitals  /77 (BP Location: Left arm, Patient Position: Sitting, Cuff Size: Large Adult)   Pulse 81   Temp 98.1 °F (36.7 °C) (Oral)   Resp 16   Ht 177.8 cm (70\")   Wt 88 kg (194 lb)   SpO2 95%   BMI 27.84 kg/m²       Current Outpatient Medications:   •  albuterol sulfate HFA (PROAIR HFA) 108 (90 Base) MCG/ACT inhaler, PROAIR  (90 Base) MCG/ACT AERS, Disp: , Rfl:   •  amLODIPine (NORVASC) 10 MG tablet, Take 1 tablet by mouth Daily., Disp: 90 tablet, Rfl: 1  •  aspirin (ASPIR-LOW) 81 MG EC tablet, ASPIR-LOW 81 MG TBEC, Disp: , Rfl:   •  atorvastatin (LIPITOR) 40 MG tablet, ATORVASTATIN CALCIUM 40 MG TABS, Disp: , Rfl:   •  Cholecalciferol (VITAMIN D) 50 MCG (2000 UT) capsule, VITAMIN D 2000 UNIT CAPS, Disp: , Rfl:   •  hydroCHLOROthiazide (MICROZIDE) 12.5 MG capsule, Take 1 capsule by mouth Daily., Disp: 90 capsule, Rfl: 1  •  lisinopril (PRINIVIL,ZESTRIL) 40 MG tablet, Take 1 tablet by mouth Daily., Disp: 90 tablet, Rfl: 1  •  metFORMIN ER (GLUCOPHAGE-XR) 500 MG 24 hr tablet, METFORMIN HCL  MG XR24H-TAB, Disp: , Rfl:   No current facility-administered medications for this visit.     Objective   Physical Exam   Constitutional: He is oriented to person, place, and time. He appears well-developed and well-nourished.   HENT:   Head: Normocephalic and atraumatic.   Eyes: Conjunctivae and EOM are normal. Pupils are equal, round, and reactive " to light.   Neck: Normal range of motion. Neck supple.   Cardiovascular: Normal rate, regular rhythm, normal heart sounds and intact distal pulses. Exam reveals no gallop.   No murmur heard.  Pulmonary/Chest: [place] normal and breath sounds normal. No respiratory distress. He has no rales. He exhibits no tenderness.   Musculoskeletal: Normal range of motion. He exhibits no edema or deformity.   Neurological: He is alert and oriented to person, place, and time.   Skin: Skin is warm and dry.   Nursing note and vitals reviewed.           No visits with results within 7 Day(s) from this visit.   Latest known visit with results is:   Hospital Outpatient Visit on 06/10/2019   Component Date Value Ref Range Status   • WBC 06/10/2019 10.7  4.5 - 11.5 10*3/uL Final   • RBC 06/10/2019 5.23  4.60 - 6.00 10*6/uL Final   • Hemoglobin 06/10/2019 14.4  14.0 - 18.0 g/dL Final   • Hematocrit 06/10/2019 44.4  40 - 54 % Final   • MCV 06/10/2019 84.9  80 - 94 fL Final   • MCH 06/10/2019 27.5  26 - 32 pg Final   • MCHC 06/10/2019 32.3  32 - 36 g/dL Final   • RDW 06/10/2019 15.8* 11.5 - 14.5 % Final   • Platelets 06/10/2019 336  150 - 450 10*3/uL Final   • MPV 06/10/2019 9.0  7.4 - 10.4 fL Final   • Differential Type 06/10/2019 AUTO   Final   • Neutrophils Absolute 06/10/2019 8.1  2.3 - 8.6 10*3/uL Final   • Lymphocytes Absolute 06/10/2019 1.5  0.8 - 4.8 10*3/uL Final   • Monocytes Absolute 06/10/2019 0.9  0.1 - 1.3 10*3/uL Final   • Eosinophils Absolute 06/10/2019 0.2  0.0 - 0.3 10*3/uL Final   • Basophils Absolute 06/10/2019 0.1  0 - 0.2 10*3/uL Final   • Neutrophil Rel % 06/10/2019 74  50 - 75 % Final   • Lymphocyte Rel % 06/10/2019 14* 18 - 42 % Final   • Monocyte Rel % 06/10/2019 9  2 - 11 % Final   • Eosinophil Rel % 06/10/2019 2  0 - 3 % Final   • Basophil Rel % 06/10/2019 1  0 - 2 % Final   • nRBC 06/10/2019 0  0 /100[WBCs] Final   • Absolute nRBC 06/10/2019 0  10*3/uL Final   • Total Cholesterol 06/10/2019 158  <200 mg/dL Final    • Triglycerides 06/10/2019 115  <150 mg/dL Final   • HDL Cholesterol 06/10/2019 65  >39 mg/dL Final   • LDL Cholesterol  06/10/2019 93  0 - 100 mg/dL Final   • VLDL Cholesterol 06/10/2019 0.0  <21 mg/dL Final   • LDL/HDL Ratio 06/10/2019 1.4   Final   • Chol/HDL Ratio 06/10/2019 2.4   Final   • Lipid Interpretation 06/10/2019 (SEE BELOW)   Final    Comment:   The following guidelines have been recommended by the NCEP for Total  Cholesterol, Total Triglycerides, LDL Cholesterol,and HDL Cholesterol:    TOTAL CHOLESTEROL                    HDL CHOLESTEROL   Desirable:            <200 mg/dL     Low HDL:            <40 mg/dL   Borderline High:   200-239 mg/dL     Normal:           40-60 mg/dL   High:           > or = 240 mg/dL     Desirable:          >60 mg/dL    TOTAL TRIGLYCERIDE                   LDL CHOLESTEROL   Normal:               <150 mg/dL     Optimal:           <100 mg/dL   Borderline High:   150-199 mg/dL     Low Risk:       100-129 mg/dL   High:              200-499 mg/dL     Borderline High:130-159 mg/dL   Very High:      > or = 500 mg/dL     High:           160-189 mg/dL                                        Very High:   > or = 190 mg/dL    The following ratios of LDL to HDL and Total Cholesterol to HDL are  for information only:    LDL/HDL RATIO                        TOTAL CHOLESTEROL/HDL RATIO   Desirable:              <5                                      Low Risk:       3.3-4.4    Optimal:        < or = 3.5           Average Risk:   4.4-7.1                                         Medium Risk:    7.1-11                                          High Risk:         >11         • PSA 06/10/2019 2.10  0 - 4.0 ng/mL Final    Comment: (SEE BELOW)  NOTE: Results may be falsely decreased if patient taking Biotin     • Sodium 06/10/2019 134* 136 - 144 mmol/L Final   • Potassium 06/10/2019 4.2  3.6 - 5.1 mmol/L Final   • Chloride 06/10/2019 102  101 - 111 mmol/L Final   • CO2 06/10/2019 19* 22 - 32 mmol/L  Final   • Glucose 06/10/2019 117* 65 - 99 mg/dL Final   • BUN 06/10/2019 13  8 - 20 mg/dL Final   • Creatinine 06/10/2019 1.5* 0.7 - 1.2 mg/dl Final   • Calcium 06/10/2019 9.7  8.9 - 10.3 mg/dL Final   • Total Protein 06/10/2019 7.7  6.1 - 7.9 g/dL Final   • Albumin 06/10/2019 4.3  3.5 - 4.8 g/dL Final   • Total Bilirubin 06/10/2019 0.6  0.3 - 1.2 mg/dL Final   • Alkaline Phosphatase 06/10/2019 88  32 - 91 IU/L Final   • AST (SGOT) 06/10/2019 22  15 - 41 IU/L Final   • ALT (SGPT) 06/10/2019 23  17 - 63 IU/L Final   • Anion Gap 06/10/2019 17.2  10 - 20 Final   • BUN/Creatinine Ratio 06/10/2019 8.7  6.2 - 20.3 Final   • GFR MDRD Non  06/10/2019 47* >60 mL/min/1.73m2 Final   • GFR MDRD  06/10/2019 57* >60 mL/min/1.73m2 Final   • Globulin 06/10/2019 3.4  2.5 - 3.8 G/dL Final   • A/G Ratio 06/10/2019 1.3  1.0 - 1.7 Final   • Hemoglobin A1C 06/10/2019 6.1* 0 - 5.6 % Final    Comment: (SEE BELOW)       Range          Diabetic Status  __________________________________________      <5.7 %              Normal   5.7 - 6.4 %   Increased risk for Diabetes      >6.4 %             Diabetes    These guidelines have been recommended by the  American Diabetic Association for Hgb A1c.                   Assessment/Plan   Problems Addressed this Visit        Cardiovascular and Mediastinum    Hyperlipidemia    Relevant Medications    atorvastatin (LIPITOR) 40 MG tablet    Other Relevant Orders    Comprehensive Metabolic Panel    Lipid Panel    TSH    Hypertension - Primary    Relevant Medications    lisinopril (PRINIVIL,ZESTRIL) 40 MG tablet    hydroCHLOROthiazide (MICROZIDE) 12.5 MG capsule    amLODIPine (NORVASC) 10 MG tablet    Other Relevant Orders    CBC Auto Differential       Digestive    Vitamin D deficiency       Endocrine    Impaired glucose tolerance    Relevant Orders    Hemoglobin A1c       Genitourinary    Benign prostatic hyperplasia       Other    Need for hepatitis C screening test     Relevant Orders    Hepatitis C Antibody    Need for vaccination    Relevant Medications    pneumococcal polysaccharide 23-valent (PNEUMOVAX-23) vaccine 0.5 mL (Completed)        I reviewed his medical problems and his medications.  He will continue his current meds and monitor his blood pressure at home.  Healthy lifestyle was discussed and reinforced.  I will be getting fasting blood work today.  His immunization was reviewed and updated.  He was once again advised to schedule his appointment with urologist due to elevated PSA.  He is also overdue for colonoscopy and to test was recommended, however he is refusing to proceed.  I discussed Cologuard with him as an alternative test, but he is refusing to proceed with this as well.             Requested Prescriptions     Signed Prescriptions Disp Refills   • lisinopril (PRINIVIL,ZESTRIL) 40 MG tablet 90 tablet 1     Sig: Take 1 tablet by mouth Daily.   • hydroCHLOROthiazide (MICROZIDE) 12.5 MG capsule 90 capsule 1     Sig: Take 1 capsule by mouth Daily.   • amLODIPine (NORVASC) 10 MG tablet 90 tablet 1     Sig: Take 1 tablet by mouth Daily.

## 2019-12-09 LAB — HBA1C MFR BLD: 6.2 % (ref 3.5–5.6)

## 2020-01-17 ENCOUNTER — OFFICE VISIT (OUTPATIENT)
Dept: FAMILY MEDICINE CLINIC | Facility: CLINIC | Age: 65
End: 2020-01-17

## 2020-01-17 VITALS
WEIGHT: 193 LBS | SYSTOLIC BLOOD PRESSURE: 129 MMHG | RESPIRATION RATE: 16 BRPM | TEMPERATURE: 98.7 F | BODY MASS INDEX: 27.63 KG/M2 | DIASTOLIC BLOOD PRESSURE: 85 MMHG | HEIGHT: 70 IN | HEART RATE: 101 BPM | OXYGEN SATURATION: 96 %

## 2020-01-17 DIAGNOSIS — Z00.00 WELCOME TO MEDICARE PREVENTIVE VISIT: Primary | ICD-10-CM

## 2020-01-17 DIAGNOSIS — Z12.11 COLON CANCER SCREENING: ICD-10-CM

## 2020-01-17 PROBLEM — Z23 NEED FOR VACCINATION: Status: RESOLVED | Noted: 2019-12-06 | Resolved: 2020-01-17

## 2020-01-17 PROBLEM — Z11.59 NEED FOR HEPATITIS C SCREENING TEST: Status: RESOLVED | Noted: 2019-12-06 | Resolved: 2020-01-17

## 2020-01-17 PROCEDURE — G0403 EKG FOR INITIAL PREVENT EXAM: HCPCS | Performed by: FAMILY MEDICINE

## 2020-01-17 PROCEDURE — G0402 INITIAL PREVENTIVE EXAM: HCPCS | Performed by: FAMILY MEDICINE

## 2020-01-17 RX ORDER — ATORVASTATIN CALCIUM 40 MG/1
40 TABLET, FILM COATED ORAL NIGHTLY
Qty: 90 TABLET | Refills: 1 | Status: SHIPPED | OUTPATIENT
Start: 2020-01-17 | End: 2020-11-13

## 2020-01-17 NOTE — PROGRESS NOTES
Welcome to Medicare Wellness Visit   The ABC's of the Annual Wellness Visit    Chief Complaint   Patient presents with   • Welcome To Medicare       HPI:  Antonio De Dios, -1955, is a 64 y.o. male who presents for a Welcome to Medicare Wellness Visit.  He reports doing well and he denies any complaints.  He recently retired.  He is trying to stay fairly active.  He denies any issues with balance or any falls.  He denies any memory problems or depressive symptoms.  His medications are being reviewed today.  He takes all of it as directed and he needs some refills today. Patient denies any chest pain, shortness of breath, dizziness, nausea, vomiting, or diarrhea. No visual issues reported. No headaches, numbness or tingling. No urinary issues. Patient has good appetite and denies any weight changes. No swelling reported. No emotional issues.      Recent Hospitalizations:  No hospitalization(s) within the last year..    Current Medical Providers:  Patient Care Team:  Smiley Lu MD as PCP - General (Family Medicine)    Health Habits and Functional and Cognitive Screening and Depression Screening:  Functional & Cognitive Status 2020   Do you have difficulty preparing food and eating? No   Do you have difficulty bathing yourself, getting dressed or grooming yourself? No   Do you have difficulty using the toilet? No   Do you have difficulty moving around from place to place? No   Do you have trouble with steps or getting out of a bed or a chair? No   Current Diet Well Balanced Diet   Dental Exam Up to date   Exercise (times per week) 3 times per week   Current Exercise Activities Include Walking   Do you need help using the phone?  No   Are you deaf or do you have serious difficulty hearing?  No   Do you need help with transportation? No   Do you need help shopping? No   Do you need help preparing meals?  No   Do you need help with housework?  No   Do you need help with laundry? No   Do you need help  taking your medications? No   Do you need help managing money? No   Do you ever drive or ride in a car without wearing a seat belt? No   Have you felt unusual stress, anger or loneliness in the last month? No   Who do you live with? Alone   If you need help, do you have trouble finding someone available to you? No   Have you been bothered in the last four weeks by sexual problems? No   Do you have difficulty concentrating, remembering or making decisions? No       Compared to one year ago, the patient feels his physical health is the same and his mental health is the same.    Depression Screen:  PHQ-2/PHQ-9 Depression Screening 1/17/2020   Little interest or pleasure in doing things 0   Feeling down, depressed, or hopeless 0   Total Score 0         Past Medical/Family/Social History:  The following portions of the patient's history were reviewed and updated as appropriate: allergies, current medications, past family history, past medical history, past social history, past surgical history and problem list.    No Known Allergies      Current Outpatient Medications:   •  albuterol sulfate HFA (PROAIR HFA) 108 (90 Base) MCG/ACT inhaler, PROAIR  (90 Base) MCG/ACT AERS, Disp: , Rfl:   •  amLODIPine (NORVASC) 10 MG tablet, Take 1 tablet by mouth Daily., Disp: 90 tablet, Rfl: 1  •  aspirin (ASPIR-LOW) 81 MG EC tablet, ASPIR-LOW 81 MG TBEC, Disp: , Rfl:   •  atorvastatin (LIPITOR) 40 MG tablet, Take 1 tablet by mouth Every Night., Disp: 90 tablet, Rfl: 1  •  Cholecalciferol (VITAMIN D) 50 MCG (2000 UT) capsule, VITAMIN D 2000 UNIT CAPS, Disp: , Rfl:   •  hydroCHLOROthiazide (MICROZIDE) 12.5 MG capsule, Take 1 capsule by mouth Daily., Disp: 90 capsule, Rfl: 1  •  lisinopril (PRINIVIL,ZESTRIL) 40 MG tablet, Take 1 tablet by mouth Daily., Disp: 90 tablet, Rfl: 1  •  metFORMIN ER (GLUCOPHAGE-XR) 500 MG 24 hr tablet, METFORMIN HCL  MG XR24H-TAB, Disp: , Rfl:     Aspirin use counseling: Does not need ASA (and  "currently is not on it)    Current medication list contains no high risk medications.  No harmful drug interactions have been identified.     Family History   Problem Relation Age of Onset   • Diabetes Mother    • Heart disease Mother    • Hypertension Mother    • Heart disease Father        Social History     Tobacco Use   • Smoking status: Never Smoker   • Smokeless tobacco: Never Used   Substance Use Topics   • Alcohol use: Yes       Past Surgical History:   Procedure Laterality Date   • COLONOSCOPY     • TONSILLECTOMY     • UMBILICAL HERNIA REPAIR  07/2018   • VASECTOMY         Patient Active Problem List   Diagnosis   • Asthma   • Benign prostatic hyperplasia   • Cardiomegaly   • Welcome to Medicare preventive visit   • Gastroesophageal reflux disease   • Hiatal hernia   • High prostate specific antigen (PSA)   • Hypercalcemia   • Hyperlipidemia   • Hypertension   • Hyponatremia   • Impaired glucose tolerance   • Prediabetes   • Vitamin D deficiency   • Colon cancer screening       Review of Systems   Constitutional: Negative for activity change, fatigue and fever.   Respiratory: Negative for cough, shortness of breath and wheezing.    Cardiovascular: Negative for chest pain, palpitations and leg swelling.   Gastrointestinal: Negative for constipation, diarrhea and indigestion.   Skin: Negative for color change, dry skin and rash.   Neurological: Negative for tremors and headache.       Objective     Vitals:    01/17/20 0923   BP: 129/85   BP Location: Left arm   Patient Position: Sitting   Cuff Size: Large Adult   Pulse: 101   Resp: 16   Temp: 98.7 °F (37.1 °C)   TempSrc: Oral   SpO2: 96%   Weight: 87.5 kg (193 lb)   Height: 177.8 cm (70\")       Patient's Body mass index is 27.69 kg/m². BMI is above normal parameters. Recommendations include: educational material.      No exam data present    The patient has no evidence of cognitve impairment.     Physical Exam   Constitutional: He is oriented to person, " place, and time. He appears well-developed and well-nourished.   HENT:   Head: Normocephalic and atraumatic.   Eyes: Pupils are equal, round, and reactive to light. Conjunctivae and EOM are normal.   Neck: Normal range of motion. Neck supple.   Cardiovascular: Normal rate, regular rhythm, normal heart sounds and intact distal pulses. Exam reveals no gallop.   No murmur heard.  Pulmonary/Chest: Effort normal and breath sounds normal. No respiratory distress. He has no rales. He exhibits no tenderness.   Musculoskeletal: Normal range of motion. He exhibits no edema or deformity.   Neurological: He is alert and oriented to person, place, and time.   Skin: Skin is warm and dry.   Nursing note and vitals reviewed.        Recent Lab Results:     Lab Results   Component Value Date    CHOL 137 12/06/2019    TRIG 105 12/06/2019    HDL 55 12/06/2019    VLDL 21 12/06/2019    LDLHDL 1.11 12/06/2019       Assessment/Plan   Age-appropriate Screening Schedule:  Refer to the list below for future screening recommendations based on patient's age, sex and/or medical conditions.      Health Maintenance   Topic Date Due   • ZOSTER VACCINE (2 of 3) 11/26/2017   • INFLUENZA VACCINE  08/01/2019   • COLONOSCOPY  12/05/2019   • LIPID PANEL  12/06/2020   • TDAP/TD VACCINES (2 - Td) 12/04/2025       Medicare Risks and Personalized Health Plan:  Advance Directive Discussion  Cardiovascular risk  Colon Cancer Screening  Dementia/Memory   Depression/Dysphoria  Diabetic Lab Screening   Immunizations Discussed/Encouraged (specific immunizations; Influenza )      CMS-Preventive Services Quick Reference  Medicare Preventive Services Addressed:  Annual Wellness Visit (AWV)  Cardiovascular Disease Screening Tests (may do this order every 5 years in beneficiaries without signs or symptoms of cardiovascular disease)  Colorectal Cancer Screening, Colonoscopy    Advance Care Planning:  Patient has an advance directive - a copy has been provided and is  visible in patient header      ECG 12 Lead  Date/Time: 1/17/2020 9:28 AM  Performed by: Smiley Lu MD  Authorized by: Smiley Lu MD   Comparison: not compared with previous ECG   Previous ECG: no previous ECG available  Rhythm: sinus rhythm  Rate: normal  Conduction: conduction normal  Conduction: non-specific intraventricular conduction delay  ST Segments: ST segments normal  T Waves: T waves normal  QRS axis: right  Other: no other findings  Other findings: non-specific ST-T wave changes    Clinical impression: abnormal EKG              Diagnoses and all orders for this visit:    1. Welcome to Medicare preventive visit (Primary)  -     ECG 12 Lead    2. Colon cancer screening  -     Cologuard - Stool, Per Rectum; Future    Other orders  -     atorvastatin (LIPITOR) 40 MG tablet; Take 1 tablet by mouth Every Night.  Dispense: 90 tablet; Refill: 1    Welcome to Medicare exam was done today.  I also reviewed his medical problems and his medications.  He recently had blood work done and that was reviewed as well.  He is due for colonoscopy and he was advised to schedule the test, however he does not want to proceed.  I suggested to get a Cologuard and he is willing to consider that.  He already had his flu shot this season and he also had his both pneumonia shots.  We also discussed Shingrix, which was recommended and he was advised to check with his pharmacy.  Healthy lifestyle was discussed and reinforced.    An After Visit Summary and PPPS with all of these plans were given to the patient.      Follow Up:  Return in about 6 months (around 7/17/2020) for Next scheduled follow up.

## 2020-06-05 ENCOUNTER — LAB (OUTPATIENT)
Dept: FAMILY MEDICINE CLINIC | Facility: CLINIC | Age: 65
End: 2020-06-05

## 2020-06-05 ENCOUNTER — OFFICE VISIT (OUTPATIENT)
Dept: FAMILY MEDICINE CLINIC | Facility: CLINIC | Age: 65
End: 2020-06-05

## 2020-06-05 VITALS
WEIGHT: 193 LBS | RESPIRATION RATE: 16 BRPM | DIASTOLIC BLOOD PRESSURE: 70 MMHG | HEART RATE: 104 BPM | HEIGHT: 70 IN | SYSTOLIC BLOOD PRESSURE: 120 MMHG | OXYGEN SATURATION: 97 % | BODY MASS INDEX: 27.63 KG/M2 | TEMPERATURE: 97.7 F

## 2020-06-05 DIAGNOSIS — E78.2 MIXED HYPERLIPIDEMIA: ICD-10-CM

## 2020-06-05 DIAGNOSIS — J45.20 MILD INTERMITTENT ASTHMA, UNSPECIFIED WHETHER COMPLICATED: ICD-10-CM

## 2020-06-05 DIAGNOSIS — E55.9 VITAMIN D DEFICIENCY: ICD-10-CM

## 2020-06-05 DIAGNOSIS — I10 ESSENTIAL HYPERTENSION: Primary | ICD-10-CM

## 2020-06-05 DIAGNOSIS — R73.02 IMPAIRED GLUCOSE TOLERANCE: ICD-10-CM

## 2020-06-05 LAB
ALBUMIN SERPL-MCNC: 4.7 G/DL (ref 3.5–5.2)
ALBUMIN/GLOB SERPL: 1.4 G/DL
ALP SERPL-CCNC: 96 U/L (ref 39–117)
ALT SERPL W P-5'-P-CCNC: 26 U/L (ref 1–41)
ANION GAP SERPL CALCULATED.3IONS-SCNC: 11.3 MMOL/L (ref 5–15)
AST SERPL-CCNC: 22 U/L (ref 1–40)
BILIRUB SERPL-MCNC: 0.3 MG/DL (ref 0.2–1.2)
BUN BLD-MCNC: 19 MG/DL (ref 8–23)
BUN/CREAT SERPL: 11 (ref 7–25)
CALCIUM SPEC-SCNC: 10.3 MG/DL (ref 8.6–10.5)
CHLORIDE SERPL-SCNC: 100 MMOL/L (ref 98–107)
CHOLEST SERPL-MCNC: 137 MG/DL (ref 0–200)
CO2 SERPL-SCNC: 21.7 MMOL/L (ref 22–29)
CREAT BLD-MCNC: 1.72 MG/DL (ref 0.76–1.27)
GFR SERPL CREATININE-BSD FRML MDRD: 40 ML/MIN/1.73
GLOBULIN UR ELPH-MCNC: 3.4 GM/DL
GLUCOSE BLD-MCNC: 123 MG/DL (ref 65–99)
HBA1C MFR BLD: 6.4 % (ref 3.5–5.6)
HDLC SERPL-MCNC: 54 MG/DL (ref 40–60)
LDLC SERPL CALC-MCNC: 60 MG/DL (ref 0–100)
LDLC/HDLC SERPL: 1.1 {RATIO}
POTASSIUM BLD-SCNC: 5.2 MMOL/L (ref 3.5–5.2)
PROT SERPL-MCNC: 8.1 G/DL (ref 6–8.5)
SODIUM BLD-SCNC: 133 MMOL/L (ref 136–145)
TRIGL SERPL-MCNC: 117 MG/DL (ref 0–150)
VLDLC SERPL-MCNC: 23.4 MG/DL (ref 5–40)

## 2020-06-05 PROCEDURE — 99214 OFFICE O/P EST MOD 30 MIN: CPT | Performed by: FAMILY MEDICINE

## 2020-06-05 PROCEDURE — 80061 LIPID PANEL: CPT | Performed by: FAMILY MEDICINE

## 2020-06-05 PROCEDURE — 36415 COLL VENOUS BLD VENIPUNCTURE: CPT | Performed by: FAMILY MEDICINE

## 2020-06-05 PROCEDURE — 80053 COMPREHEN METABOLIC PANEL: CPT | Performed by: FAMILY MEDICINE

## 2020-06-05 PROCEDURE — 83036 HEMOGLOBIN GLYCOSYLATED A1C: CPT | Performed by: FAMILY MEDICINE

## 2020-06-05 RX ORDER — ALBUTEROL SULFATE 90 UG/1
2 AEROSOL, METERED RESPIRATORY (INHALATION)
Qty: 1 INHALER | Refills: 1 | Status: SHIPPED | OUTPATIENT
Start: 2020-06-05 | End: 2021-12-13 | Stop reason: SDUPTHER

## 2020-06-05 RX ORDER — LISINOPRIL 40 MG/1
40 TABLET ORAL EVERY 24 HOURS
Qty: 90 TABLET | Refills: 1 | Status: SHIPPED | OUTPATIENT
Start: 2020-06-05 | End: 2020-12-04 | Stop reason: SDUPTHER

## 2020-06-05 RX ORDER — AMLODIPINE BESYLATE 10 MG/1
10 TABLET ORAL EVERY 24 HOURS
Qty: 90 TABLET | Refills: 1 | Status: SHIPPED | OUTPATIENT
Start: 2020-06-05 | End: 2020-11-13

## 2020-06-05 NOTE — PROGRESS NOTES
Subjective   Chief Complaint   Patient presents with   • Follow-up   • Asthma   • Hyperlipidemia   • Hypertension     Antonio De Dios is a 64 y.o. male.     Patient Care Team:  Smiley Lu MD as PCP - General (Family Medicine)    He is coming in today to follow-up on his medical problems and his medications.  We are addressing his hypertension, hyperlipidemia, impaired fasting sugar, asthma, and vitamin D deficiency.  He reports taking all of his medicines as directed and he denies any side effects.  He periodically checks his blood pressure at home and gets very good readings.  He has been retired for few months now, but he still works few days a week and he enjoys that.  He likes to play golf and he already started doing that this year.  Patient denies any chest pain, shortness of breath, dizziness, nausea, vomiting, or diarrhea. No visual issues reported. No headaches, numbness or tingling. No urinary issues. Patient has good appetite and denies any weight changes. No swelling reported. No emotional issues.         The following portions of the patient's history were reviewed and updated as appropriate: allergies, current medications, past family history, past medical history, past social history, past surgical history and problem list.  Past Medical History:   Diagnosis Date   • Asthma    • BPH (benign prostatic hyperplasia)    • Elevated PSA    • GERD (gastroesophageal reflux disease)    • Hyperlipidemia    • Hypertension    • Prediabetes    • Vitamin D deficiency      Past Surgical History:   Procedure Laterality Date   • COLONOSCOPY  2008   • TONSILLECTOMY     • UMBILICAL HERNIA REPAIR  07/2018   • VASECTOMY       The patient has a family history of  Family History   Problem Relation Age of Onset   • Diabetes Mother    • Heart disease Mother    • Hypertension Mother    • Heart disease Father      Social History     Socioeconomic History   • Marital status:      Spouse name: Not on file   • Number  "of children: Not on file   • Years of education: Not on file   • Highest education level: Not on file   Tobacco Use   • Smoking status: Never Smoker   • Smokeless tobacco: Never Used   Substance and Sexual Activity   • Alcohol use: Yes   • Drug use: No   • Sexual activity: Defer       Review of Systems   Constitutional: Negative for activity change, fatigue and fever.   Respiratory: Negative for cough, shortness of breath and wheezing.    Cardiovascular: Negative for chest pain, palpitations and leg swelling.   Gastrointestinal: Negative for constipation, diarrhea and indigestion.   Skin: Negative for color change, dry skin and rash.   Neurological: Negative for tremors and headache.     Visit Vitals  /70 (BP Location: Right arm, Patient Position: Sitting, Cuff Size: Large Adult)   Pulse 104   Temp 97.7 °F (36.5 °C) (Temporal)   Resp 16   Ht 177.8 cm (70\")   Wt 87.5 kg (193 lb)   SpO2 97%   BMI 27.69 kg/m²       Current Outpatient Medications:   •  albuterol sulfate HFA (ProAir HFA) 108 (90 Base) MCG/ACT inhaler, Inhale 2 puffs 4 (Four) Times a Day., Disp: 1 inhaler, Rfl: 1  •  amLODIPine (NORVASC) 10 MG tablet, Take 1 tablet by mouth Daily., Disp: 90 tablet, Rfl: 1  •  aspirin (ASPIR-LOW) 81 MG EC tablet, ASPIR-LOW 81 MG TBEC, Disp: , Rfl:   •  atorvastatin (LIPITOR) 40 MG tablet, Take 1 tablet by mouth Every Night., Disp: 90 tablet, Rfl: 1  •  Cholecalciferol (VITAMIN D) 50 MCG (2000 UT) capsule, VITAMIN D 2000 UNIT CAPS, Disp: , Rfl:   •  lisinopril (PRINIVIL,ZESTRIL) 40 MG tablet, Take 1 tablet by mouth Daily., Disp: 90 tablet, Rfl: 1  •  metFORMIN ER (GLUCOPHAGE-XR) 500 MG 24 hr tablet, METFORMIN HCL  MG XR24H-TAB, Disp: , Rfl:     Objective   Physical Exam   Constitutional: He is oriented to person, place, and time. He appears well-developed and well-nourished.   HENT:   Head: Normocephalic and atraumatic.   Eyes: Pupils are equal, round, and reactive to light. Conjunctivae and EOM are normal. "   Neck: Normal range of motion. Neck supple.   Cardiovascular: Normal rate, regular rhythm, normal heart sounds and intact distal pulses. Exam reveals no gallop.   No murmur heard.  Pulmonary/Chest: Effort normal and breath sounds normal. No respiratory distress. He has no rales. He exhibits no tenderness.   Musculoskeletal: Normal range of motion. He exhibits no edema or deformity.   Neurological: He is alert and oriented to person, place, and time.   Skin: Skin is warm and dry.   Nursing note and vitals reviewed.           No visits with results within 7 Day(s) from this visit.   Latest known visit with results is:   Office Visit on 12/06/2019   Component Date Value Ref Range Status   • WBC 12/06/2019 9.69  3.40 - 10.80 10*3/mm3 Final   • RBC 12/06/2019 5.34  4.14 - 5.80 10*6/mm3 Final   • Hemoglobin 12/06/2019 13.3  13.0 - 17.7 g/dL Final   • Hematocrit 12/06/2019 42.7  37.5 - 51.0 % Final   • MCV 12/06/2019 80.0  79.0 - 97.0 fL Final   • MCH 12/06/2019 24.9* 26.6 - 33.0 pg Final   • MCHC 12/06/2019 31.1* 31.5 - 35.7 g/dL Final   • RDW 12/06/2019 15.0  12.3 - 15.4 % Final   • RDW-SD 12/06/2019 42.8  37.0 - 54.0 fl Final   • MPV 12/06/2019 10.5  6.0 - 12.0 fL Final   • Platelets 12/06/2019 331  140 - 450 10*3/mm3 Final   • Neutrophil % 12/06/2019 69.0  42.7 - 76.0 % Final   • Lymphocyte % 12/06/2019 18.0* 19.6 - 45.3 % Final   • Monocyte % 12/06/2019 10.1  5.0 - 12.0 % Final   • Eosinophil % 12/06/2019 1.9  0.3 - 6.2 % Final   • Basophil % 12/06/2019 0.6  0.0 - 1.5 % Final   • Immature Grans % 12/06/2019 0.4  0.0 - 0.5 % Final   • Neutrophils, Absolute 12/06/2019 6.69  1.70 - 7.00 10*3/mm3 Final   • Lymphocytes, Absolute 12/06/2019 1.74  0.70 - 3.10 10*3/mm3 Final   • Monocytes, Absolute 12/06/2019 0.98* 0.10 - 0.90 10*3/mm3 Final   • Eosinophils, Absolute 12/06/2019 0.18  0.00 - 0.40 10*3/mm3 Final   • Basophils, Absolute 12/06/2019 0.06  0.00 - 0.20 10*3/mm3 Final   • Immature Grans, Absolute 12/06/2019 0.04   0.00 - 0.05 10*3/mm3 Final   • nRBC 12/06/2019 0.0  0.0 - 0.2 /100 WBC Final   • Glucose 12/06/2019 112* 65 - 99 mg/dL Final   • BUN 12/06/2019 20  8 - 23 mg/dL Final   • Creatinine 12/06/2019 1.38* 0.76 - 1.27 mg/dL Final   • Sodium 12/06/2019 135* 136 - 145 mmol/L Final   • Potassium 12/06/2019 4.2  3.5 - 5.2 mmol/L Final   • Chloride 12/06/2019 97* 98 - 107 mmol/L Final   • CO2 12/06/2019 25.2  22.0 - 29.0 mmol/L Final   • Calcium 12/06/2019 9.8  8.6 - 10.5 mg/dL Final   • Total Protein 12/06/2019 8.1  6.0 - 8.5 g/dL Final   • Albumin 12/06/2019 4.70  3.50 - 5.20 g/dL Final   • ALT (SGPT) 12/06/2019 25  1 - 41 U/L Final   • AST (SGOT) 12/06/2019 20  1 - 40 U/L Final   • Alkaline Phosphatase 12/06/2019 118* 39 - 117 U/L Final   • Total Bilirubin 12/06/2019 0.4  0.2 - 1.2 mg/dL Final   • eGFR Non African Amer 12/06/2019 52* >60 mL/min/1.73 Final   • Globulin 12/06/2019 3.4  gm/dL Final   • A/G Ratio 12/06/2019 1.4  g/dL Final   • BUN/Creatinine Ratio 12/06/2019 14.5  7.0 - 25.0 Final   • Anion Gap 12/06/2019 12.8  5.0 - 15.0 mmol/L Final   • Total Cholesterol 12/06/2019 137  0 - 200 mg/dL Final   • Triglycerides 12/06/2019 105  0 - 150 mg/dL Final   • HDL Cholesterol 12/06/2019 55  40 - 60 mg/dL Final   • LDL Cholesterol  12/06/2019 61  0 - 100 mg/dL Final   • VLDL Cholesterol 12/06/2019 21  5 - 40 mg/dL Final   • LDL/HDL Ratio 12/06/2019 1.11   Final   • TSH 12/06/2019 2.380  0.270 - 4.200 uIU/mL Final   • Hepatitis C Ab 12/06/2019 Non-Reactive  Non-Reactive Final   • Hemoglobin A1C 12/06/2019 6.2* 3.5 - 5.6 % Final             Advance Care Planning   ACP discussion was held with the patient during this visit. Patient has an advance directive in EMR which is still valid.       Assessment/Plan   Problems Addressed this Visit        Cardiovascular and Mediastinum    Hyperlipidemia    Relevant Orders    Comprehensive Metabolic Panel    Lipid Panel    Hypertension - Primary    Relevant Medications    amLODIPine  (NORVASC) 10 MG tablet    lisinopril (PRINIVIL,ZESTRIL) 40 MG tablet       Respiratory    Asthma    Relevant Medications    albuterol sulfate HFA (ProAir HFA) 108 (90 Base) MCG/ACT inhaler       Digestive    Vitamin D deficiency       Endocrine    Impaired glucose tolerance    Relevant Orders    Hemoglobin A1c        I reviewed his medical problems and his medications.  He will continue his current medicines and periodically monitor his blood pressure.  Healthy lifestyle was discussed and reinforced.  I will be getting some blood work.  He is refusing colonoscopy, but order for Cologuard was given earlier this year and he tells me today that he submitted the stool sample, however I have never received a result.  We will be contacting the company for the result and clarifications.             Requested Prescriptions     Signed Prescriptions Disp Refills   • albuterol sulfate HFA (ProAir HFA) 108 (90 Base) MCG/ACT inhaler 1 inhaler 1     Sig: Inhale 2 puffs 4 (Four) Times a Day.   • amLODIPine (NORVASC) 10 MG tablet 90 tablet 1     Sig: Take 1 tablet by mouth Daily.   • lisinopril (PRINIVIL,ZESTRIL) 40 MG tablet 90 tablet 1     Sig: Take 1 tablet by mouth Daily.

## 2020-06-06 DIAGNOSIS — N18.30 CKD (CHRONIC KIDNEY DISEASE), STAGE III (HCC): Primary | ICD-10-CM

## 2020-11-13 RX ORDER — AMLODIPINE BESYLATE 10 MG/1
TABLET ORAL
Qty: 90 TABLET | Refills: 0 | Status: SHIPPED | OUTPATIENT
Start: 2020-11-13 | End: 2021-04-04

## 2020-11-13 RX ORDER — ATORVASTATIN CALCIUM 40 MG/1
TABLET, FILM COATED ORAL
Qty: 90 TABLET | Refills: 0 | Status: SHIPPED | OUTPATIENT
Start: 2020-11-13 | End: 2020-12-04 | Stop reason: SDUPTHER

## 2020-12-04 ENCOUNTER — OFFICE VISIT (OUTPATIENT)
Dept: FAMILY MEDICINE CLINIC | Facility: CLINIC | Age: 65
End: 2020-12-04

## 2020-12-04 VITALS
BODY MASS INDEX: 27.92 KG/M2 | DIASTOLIC BLOOD PRESSURE: 79 MMHG | SYSTOLIC BLOOD PRESSURE: 136 MMHG | HEIGHT: 70 IN | OXYGEN SATURATION: 98 % | WEIGHT: 195 LBS | TEMPERATURE: 98.4 F | RESPIRATION RATE: 16 BRPM | HEART RATE: 103 BPM

## 2020-12-04 DIAGNOSIS — I10 ESSENTIAL HYPERTENSION: Primary | ICD-10-CM

## 2020-12-04 DIAGNOSIS — R73.03 PREDIABETES: ICD-10-CM

## 2020-12-04 DIAGNOSIS — J45.20 MILD INTERMITTENT ASTHMA, UNSPECIFIED WHETHER COMPLICATED: ICD-10-CM

## 2020-12-04 DIAGNOSIS — E78.2 MIXED HYPERLIPIDEMIA: ICD-10-CM

## 2020-12-04 DIAGNOSIS — N40.0 BENIGN PROSTATIC HYPERPLASIA WITHOUT LOWER URINARY TRACT SYMPTOMS: ICD-10-CM

## 2020-12-04 DIAGNOSIS — N18.31 STAGE 3A CHRONIC KIDNEY DISEASE (HCC): ICD-10-CM

## 2020-12-04 PROBLEM — E83.52 HYPERCALCEMIA: Status: RESOLVED | Noted: 2017-06-07 | Resolved: 2020-12-04

## 2020-12-04 PROBLEM — E87.1 HYPONATREMIA: Status: RESOLVED | Noted: 2017-12-05 | Resolved: 2020-12-04

## 2020-12-04 PROCEDURE — 99214 OFFICE O/P EST MOD 30 MIN: CPT | Performed by: FAMILY MEDICINE

## 2020-12-04 RX ORDER — ATORVASTATIN CALCIUM 40 MG/1
40 TABLET, FILM COATED ORAL
Qty: 90 TABLET | Refills: 1 | Status: SHIPPED | OUTPATIENT
Start: 2020-12-04 | End: 2021-07-21

## 2020-12-04 RX ORDER — LISINOPRIL 40 MG/1
40 TABLET ORAL EVERY 24 HOURS
Qty: 90 TABLET | Refills: 1 | Status: SHIPPED | OUTPATIENT
Start: 2020-12-04 | End: 2021-07-21

## 2020-12-04 NOTE — PROGRESS NOTES
Subjective   Chief Complaint   Patient presents with   • Follow-up      month   • Hypertension   • Hyperlipidemia   • Asthma   • Prediabetes     Antonio De Dios is a 65 y.o. male.     Patient Care Team:  Smiley Lu MD as PCP - General (Family Medicine)    He is coming here today to follow-up on his chronic medical problems and his medicines.  We are addressing his hypertension, hyperlipidemia, asthma, prediabetes, and BPH.  He takes his medications as directed, he periodically checks his blood pressure and gets good readings.  His blood work done in June of this year showed elevated creatinine and he was advised to come for the recheck shortly after, but he never did.  He tells me that around the same time he stopped taking anti-inflammatory as he used to take a lot of that. Patient denies any chest pain, shortness of breath, dizziness, nausea, vomiting, or diarrhea. No visual issues reported. No headaches, numbness or tingling. No urinary issues.  Like urgency, frequency, or discomfort upon urination. Patient has good appetite and denies any weight changes. No swelling reported.  No rashes or any other skin issues reported.  Patient denies polyuria or polydipsia as well as heat or cold intolerance.  No emotional issues or insomnia.         The following portions of the patient's history were reviewed and updated as appropriate: allergies, current medications, past family history, past medical history, past social history, past surgical history and problem list.  Past Medical History:   Diagnosis Date   • Asthma    • BPH (benign prostatic hyperplasia)    • Elevated PSA    • GERD (gastroesophageal reflux disease)    • Hyperlipidemia    • Hypertension    • Prediabetes    • Vitamin D deficiency      Past Surgical History:   Procedure Laterality Date   • COLONOSCOPY  2008   • TONSILLECTOMY     • UMBILICAL HERNIA REPAIR  07/2018   • VASECTOMY       The patient has a family history of  Family History   Problem  "Relation Age of Onset   • Diabetes Mother    • Heart disease Mother    • Hypertension Mother    • Heart disease Father      Social History     Socioeconomic History   • Marital status:      Spouse name: Not on file   • Number of children: Not on file   • Years of education: Not on file   • Highest education level: Not on file   Tobacco Use   • Smoking status: Never Smoker   • Smokeless tobacco: Never Used   Substance and Sexual Activity   • Alcohol use: Yes   • Drug use: No   • Sexual activity: Defer       Review of Systems   Constitutional: Negative for activity change, fatigue and fever.   Respiratory: Negative for cough, shortness of breath and wheezing.    Cardiovascular: Negative for chest pain, palpitations and leg swelling.   Gastrointestinal: Negative for constipation, diarrhea and indigestion.   Musculoskeletal: Positive for arthralgias. Negative for back pain, gait problem and bursitis.   Skin: Negative for color change, dry skin and rash.   Neurological: Negative for tremors and headache.     Visit Vitals  /79 (BP Location: Right arm, Patient Position: Sitting, Cuff Size: Large Adult)   Pulse 103   Temp 98.4 °F (36.9 °C) (Temporal)   Resp 16   Ht 177.8 cm (70\")   Wt 88.5 kg (195 lb)   SpO2 98%   BMI 27.98 kg/m²       Current Outpatient Medications:   •  albuterol sulfate HFA (ProAir HFA) 108 (90 Base) MCG/ACT inhaler, Inhale 2 puffs 4 (Four) Times a Day., Disp: 1 inhaler, Rfl: 1  •  amLODIPine (NORVASC) 10 MG tablet, TAKE 1 TABLET BY MOUTH EVERY DAY, Disp: 90 tablet, Rfl: 0  •  aspirin (ASPIR-LOW) 81 MG EC tablet, ASPIR-LOW 81 MG TBEC, Disp: , Rfl:   •  atorvastatin (LIPITOR) 40 MG tablet, Take 1 tablet by mouth every night at bedtime., Disp: 90 tablet, Rfl: 1  •  Cholecalciferol (VITAMIN D) 50 MCG (2000 UT) capsule, VITAMIN D 2000 UNIT CAPS, Disp: , Rfl:   •  lisinopril (PRINIVIL,ZESTRIL) 40 MG tablet, Take 1 tablet by mouth Daily., Disp: 90 tablet, Rfl: 1  •  metFORMIN ER (GLUCOPHAGE-XR) " 500 MG 24 hr tablet, METFORMIN HCL  MG XR24H-TAB, Disp: , Rfl:     Objective   Physical Exam  Vitals signs and nursing note reviewed.   Constitutional:       Appearance: Normal appearance. He is well-developed.   HENT:      Head: Normocephalic and atraumatic.   Eyes:      Conjunctiva/sclera: Conjunctivae normal.      Pupils: Pupils are equal, round, and reactive to light.   Neck:      Musculoskeletal: Normal range of motion and neck supple.   Cardiovascular:      Rate and Rhythm: Normal rate and regular rhythm.      Heart sounds: Normal heart sounds. No murmur. No gallop.    Pulmonary:      Effort: Pulmonary effort is normal. No respiratory distress.      Breath sounds: Normal breath sounds. No wheezing, rhonchi or rales.   Chest:      Chest wall: No tenderness.   Musculoskeletal: Normal range of motion.         General: No swelling or deformity.   Skin:     General: Skin is warm and dry.   Neurological:      General: No focal deficit present.      Mental Status: He is alert and oriented to person, place, and time.              No visits with results within 7 Day(s) from this visit.   Latest known visit with results is:   Office Visit on 06/05/2020   Component Date Value Ref Range Status   • Glucose 06/05/2020 123* 65 - 99 mg/dL Final   • BUN 06/05/2020 19  8 - 23 mg/dL Final   • Creatinine 06/05/2020 1.72* 0.76 - 1.27 mg/dL Final   • Sodium 06/05/2020 133* 136 - 145 mmol/L Final   • Potassium 06/05/2020 5.2  3.5 - 5.2 mmol/L Final   • Chloride 06/05/2020 100  98 - 107 mmol/L Final   • CO2 06/05/2020 21.7* 22.0 - 29.0 mmol/L Final   • Calcium 06/05/2020 10.3  8.6 - 10.5 mg/dL Final   • Total Protein 06/05/2020 8.1  6.0 - 8.5 g/dL Final   • Albumin 06/05/2020 4.70  3.50 - 5.20 g/dL Final   • ALT (SGPT) 06/05/2020 26  1 - 41 U/L Final   • AST (SGOT) 06/05/2020 22  1 - 40 U/L Final   • Alkaline Phosphatase 06/05/2020 96  39 - 117 U/L Final   • Total Bilirubin 06/05/2020 0.3  0.2 - 1.2 mg/dL Final   • eGFR Non   Amer 06/05/2020 40* >60 mL/min/1.73 Final   • Globulin 06/05/2020 3.4  gm/dL Final   • A/G Ratio 06/05/2020 1.4  g/dL Final   • BUN/Creatinine Ratio 06/05/2020 11.0  7.0 - 25.0 Final   • Anion Gap 06/05/2020 11.3  5.0 - 15.0 mmol/L Final   • Total Cholesterol 06/05/2020 137  0 - 200 mg/dL Final   • Triglycerides 06/05/2020 117  0 - 150 mg/dL Final   • HDL Cholesterol 06/05/2020 54  40 - 60 mg/dL Final   • LDL Cholesterol  06/05/2020 60  0 - 100 mg/dL Final   • VLDL Cholesterol 06/05/2020 23.4  5 - 40 mg/dL Final   • LDL/HDL Ratio 06/05/2020 1.10   Final   • Hemoglobin A1C 06/05/2020 6.4* 3.5 - 5.6 % Final                 Assessment/Plan   Diagnoses and all orders for this visit:    1. Essential hypertension (Primary)  -     Comprehensive Metabolic Panel  -     lisinopril (PRINIVIL,ZESTRIL) 40 MG tablet; Take 1 tablet by mouth Daily.  Dispense: 90 tablet; Refill: 1  -     CBC Auto Differential    2. Mixed hyperlipidemia  -     Comprehensive Metabolic Panel  -     Lipid Panel  -     TSH  -     atorvastatin (LIPITOR) 40 MG tablet; Take 1 tablet by mouth every night at bedtime.  Dispense: 90 tablet; Refill: 1    3. Prediabetes  -     Hemoglobin A1c    4. Mild intermittent asthma, unspecified whether complicated    5. Benign prostatic hyperplasia without lower urinary tract symptoms  -     PSA DIAGNOSTIC; Future    6. Stage 3a chronic kidney disease      I reviewed his medical problems and his medications.  I will be getting fasting blood work.  His creatinine was slightly elevated from his baseline.  His readings have been consistent with CKD stage III.  We talked about the importance of good hydration and regular blood work monitoring as medications which he takes can possibly affect his kidney function.  I once again encouraged him to schedule colonoscopy or at least get a Cologuard, but he does not want to proceed at this time.           Requested Prescriptions     Signed Prescriptions Disp Refills   •  lisinopril (PRINIVIL,ZESTRIL) 40 MG tablet 90 tablet 1     Sig: Take 1 tablet by mouth Daily.   • atorvastatin (LIPITOR) 40 MG tablet 90 tablet 1     Sig: Take 1 tablet by mouth every night at bedtime.

## 2021-04-04 RX ORDER — AMLODIPINE BESYLATE 10 MG/1
TABLET ORAL
Qty: 90 TABLET | Refills: 0 | Status: SHIPPED | OUTPATIENT
Start: 2021-04-04 | End: 2021-12-13

## 2021-06-11 ENCOUNTER — OFFICE VISIT (OUTPATIENT)
Dept: FAMILY MEDICINE CLINIC | Facility: CLINIC | Age: 66
End: 2021-06-11

## 2021-06-11 ENCOUNTER — LAB (OUTPATIENT)
Dept: FAMILY MEDICINE CLINIC | Facility: CLINIC | Age: 66
End: 2021-06-11

## 2021-06-11 VITALS
OXYGEN SATURATION: 95 % | DIASTOLIC BLOOD PRESSURE: 66 MMHG | HEIGHT: 70 IN | BODY MASS INDEX: 27.49 KG/M2 | RESPIRATION RATE: 16 BRPM | SYSTOLIC BLOOD PRESSURE: 110 MMHG | WEIGHT: 192 LBS | TEMPERATURE: 97.3 F | HEART RATE: 102 BPM

## 2021-06-11 DIAGNOSIS — N18.31 STAGE 3A CHRONIC KIDNEY DISEASE (HCC): ICD-10-CM

## 2021-06-11 DIAGNOSIS — Z00.00 MEDICARE ANNUAL WELLNESS VISIT, INITIAL: Primary | ICD-10-CM

## 2021-06-11 DIAGNOSIS — N40.0 BENIGN PROSTATIC HYPERPLASIA WITHOUT LOWER URINARY TRACT SYMPTOMS: ICD-10-CM

## 2021-06-11 DIAGNOSIS — R73.03 PREDIABETES: ICD-10-CM

## 2021-06-11 DIAGNOSIS — E78.2 MIXED HYPERLIPIDEMIA: ICD-10-CM

## 2021-06-11 DIAGNOSIS — Z12.11 COLON CANCER SCREENING: ICD-10-CM

## 2021-06-11 DIAGNOSIS — I10 ESSENTIAL HYPERTENSION: ICD-10-CM

## 2021-06-11 LAB
ALBUMIN SERPL-MCNC: 4.4 G/DL (ref 3.5–5.2)
ALBUMIN/GLOB SERPL: 1.3 G/DL
ALP SERPL-CCNC: 104 U/L (ref 39–117)
ALT SERPL W P-5'-P-CCNC: 27 U/L (ref 1–41)
ANION GAP SERPL CALCULATED.3IONS-SCNC: 12.9 MMOL/L (ref 5–15)
AST SERPL-CCNC: 18 U/L (ref 1–40)
BACTERIA UR QL AUTO: ABNORMAL /HPF
BASOPHILS # BLD AUTO: 0.08 10*3/MM3 (ref 0–0.2)
BASOPHILS NFR BLD AUTO: 0.6 % (ref 0–1.5)
BILIRUB SERPL-MCNC: 0.4 MG/DL (ref 0–1.2)
BILIRUB UR QL STRIP: NEGATIVE
BUN SERPL-MCNC: 13 MG/DL (ref 8–23)
BUN/CREAT SERPL: 9 (ref 7–25)
CALCIUM SPEC-SCNC: 10.2 MG/DL (ref 8.6–10.5)
CHLORIDE SERPL-SCNC: 100 MMOL/L (ref 98–107)
CHOLEST SERPL-MCNC: 133 MG/DL (ref 0–200)
CLARITY UR: CLEAR
CO2 SERPL-SCNC: 23.1 MMOL/L (ref 22–29)
COLOR UR: YELLOW
CREAT SERPL-MCNC: 1.45 MG/DL (ref 0.76–1.27)
DEPRECATED RDW RBC AUTO: 48.4 FL (ref 37–54)
EOSINOPHIL # BLD AUTO: 0.19 10*3/MM3 (ref 0–0.4)
EOSINOPHIL NFR BLD AUTO: 1.5 % (ref 0.3–6.2)
ERYTHROCYTE [DISTWIDTH] IN BLOOD BY AUTOMATED COUNT: 16.8 % (ref 12.3–15.4)
GFR SERPL CREATININE-BSD FRML MDRD: 49 ML/MIN/1.73
GLOBULIN UR ELPH-MCNC: 3.3 GM/DL
GLUCOSE SERPL-MCNC: 121 MG/DL (ref 65–99)
GLUCOSE UR STRIP-MCNC: NEGATIVE MG/DL
HBA1C MFR BLD: 6.5 % (ref 3.5–5.6)
HCT VFR BLD AUTO: 43.6 % (ref 37.5–51)
HDLC SERPL-MCNC: 54 MG/DL (ref 40–60)
HGB BLD-MCNC: 13.6 G/DL (ref 13–17.7)
HGB UR QL STRIP.AUTO: NEGATIVE
HYALINE CASTS UR QL AUTO: ABNORMAL /LPF
IMM GRANULOCYTES # BLD AUTO: 0.05 10*3/MM3 (ref 0–0.05)
IMM GRANULOCYTES NFR BLD AUTO: 0.4 % (ref 0–0.5)
KETONES UR QL STRIP: NEGATIVE
LDLC SERPL CALC-MCNC: 60 MG/DL (ref 0–100)
LDLC/HDLC SERPL: 1.09 {RATIO}
LEUKOCYTE ESTERASE UR QL STRIP.AUTO: NEGATIVE
LYMPHOCYTES # BLD AUTO: 1.88 10*3/MM3 (ref 0.7–3.1)
LYMPHOCYTES NFR BLD AUTO: 14.9 % (ref 19.6–45.3)
MCH RBC QN AUTO: 25.1 PG (ref 26.6–33)
MCHC RBC AUTO-ENTMCNC: 31.2 G/DL (ref 31.5–35.7)
MCV RBC AUTO: 80.4 FL (ref 79–97)
MONOCYTES # BLD AUTO: 1.05 10*3/MM3 (ref 0.1–0.9)
MONOCYTES NFR BLD AUTO: 8.3 % (ref 5–12)
NEUTROPHILS NFR BLD AUTO: 74.3 % (ref 42.7–76)
NEUTROPHILS NFR BLD AUTO: 9.36 10*3/MM3 (ref 1.7–7)
NITRITE UR QL STRIP: NEGATIVE
NRBC BLD AUTO-RTO: 0 /100 WBC (ref 0–0.2)
PH UR STRIP.AUTO: 7 [PH] (ref 5–8)
PLATELET # BLD AUTO: 384 10*3/MM3 (ref 140–450)
PMV BLD AUTO: 11 FL (ref 6–12)
POTASSIUM SERPL-SCNC: 3.8 MMOL/L (ref 3.5–5.2)
PROT SERPL-MCNC: 7.7 G/DL (ref 6–8.5)
PROT UR QL STRIP: ABNORMAL
PSA SERPL-MCNC: 4.59 NG/ML (ref 0–4)
RBC # BLD AUTO: 5.42 10*6/MM3 (ref 4.14–5.8)
RBC # UR: ABNORMAL /HPF
REF LAB TEST METHOD: ABNORMAL
SODIUM SERPL-SCNC: 136 MMOL/L (ref 136–145)
SP GR UR STRIP: 1.02 (ref 1–1.03)
SQUAMOUS #/AREA URNS HPF: ABNORMAL /HPF
TRIGL SERPL-MCNC: 101 MG/DL (ref 0–150)
TSH SERPL DL<=0.05 MIU/L-ACNC: 3.45 UIU/ML (ref 0.27–4.2)
UROBILINOGEN UR QL STRIP: ABNORMAL
VLDLC SERPL-MCNC: 19 MG/DL (ref 5–40)
WBC # BLD AUTO: 12.61 10*3/MM3 (ref 3.4–10.8)
WBC UR QL AUTO: ABNORMAL /HPF

## 2021-06-11 PROCEDURE — 80053 COMPREHEN METABOLIC PANEL: CPT | Performed by: FAMILY MEDICINE

## 2021-06-11 PROCEDURE — 83036 HEMOGLOBIN GLYCOSYLATED A1C: CPT | Performed by: FAMILY MEDICINE

## 2021-06-11 PROCEDURE — 81001 URINALYSIS AUTO W/SCOPE: CPT | Performed by: FAMILY MEDICINE

## 2021-06-11 PROCEDURE — 84153 ASSAY OF PSA TOTAL: CPT | Performed by: FAMILY MEDICINE

## 2021-06-11 PROCEDURE — 85025 COMPLETE CBC W/AUTO DIFF WBC: CPT | Performed by: FAMILY MEDICINE

## 2021-06-11 PROCEDURE — G0439 PPPS, SUBSEQ VISIT: HCPCS | Performed by: FAMILY MEDICINE

## 2021-06-11 PROCEDURE — 36415 COLL VENOUS BLD VENIPUNCTURE: CPT | Performed by: FAMILY MEDICINE

## 2021-06-11 PROCEDURE — 80061 LIPID PANEL: CPT | Performed by: FAMILY MEDICINE

## 2021-06-11 PROCEDURE — 84443 ASSAY THYROID STIM HORMONE: CPT | Performed by: FAMILY MEDICINE

## 2021-06-11 NOTE — PROGRESS NOTES
Initial Medicare Wellness Visit   The ABC's of the Annual Wellness Visit    Chief Complaint   Patient presents with   • Medicare Wellness-Initial Visit       HPI:  Antonio De Dios, -1955, is a 65 y.o. male who presents for an Initial Medicare Wellness Visit.  He reports doing well.  He is retired few months ago and he is trying to enjoy his long term.  His lifestyle overall is pretty healthy.  No issues with depression or memory problems are reported.  No balance issues or falls are reported. Patient does not report any chest pain, shortness of breath, dizziness, nausea, vomiting, or diarrhea, visual issues, headaches, numbness or tingling. No urinary issues reported like urgency, frequency, or discomfort upon urination.  No significant weight changes reported.  No swelling reported.  No rashes or any other skin issues reported. No emotional issues or insomnia.    Recent Hospitalizations:  No hospitalization(s) within the last year..    Current Medical Providers:  Patient Care Team:  Smiley Lu MD as PCP - General (Family Medicine)    Health Habits and Functional and Cognitive Screening and Depression Screening:  Functional & Cognitive Status 2021   Do you have difficulty preparing food and eating? No   Do you have difficulty bathing yourself, getting dressed or grooming yourself? No   Do you have difficulty using the toilet? No   Do you have difficulty moving around from place to place? No   Do you have trouble with steps or getting out of a bed or a chair? No   Current Diet Well Balanced Diet   Dental Exam Up to date   Eye Exam Up to date   Exercise (times per week) 0 times per week   Current Exercises Include No Regular Exercise   Current Exercise Activities Include -   Do you need help using the phone?  No   Are you deaf or do you have serious difficulty hearing?  No   Do you need help with transportation? No   Do you need help shopping? No   Do you need help preparing meals?  No   Do you need  help with housework?  No   Do you need help with laundry? No   Do you need help taking your medications? No   Do you need help managing money? No   Do you ever drive or ride in a car without wearing a seat belt? No   Have you felt unusual stress, anger or loneliness in the last month? No   Who do you live with? Alone   If you need help, do you have trouble finding someone available to you? No   Have you been bothered in the last four weeks by sexual problems? -   Do you have difficulty concentrating, remembering or making decisions? No       Compared to one year ago, the patient feels his physical health is the same and his mental health is the same.    Depression Screen:  PHQ-2/PHQ-9 Depression Screening 6/11/2021   Little interest or pleasure in doing things 0   Feeling down, depressed, or hopeless 0   Total Score 0         Past Medical/Family/Social History:  The following portions of the patient's history were reviewed and updated as appropriate: allergies, current medications, past family history, past medical history, past social history, past surgical history and problem list.    No Known Allergies      Current Outpatient Medications:   •  albuterol sulfate HFA (ProAir HFA) 108 (90 Base) MCG/ACT inhaler, Inhale 2 puffs 4 (Four) Times a Day., Disp: 1 inhaler, Rfl: 1  •  amLODIPine (NORVASC) 10 MG tablet, TAKE 1 TABLET BY MOUTH EVERY DAY, Disp: 90 tablet, Rfl: 0  •  aspirin (ASPIR-LOW) 81 MG EC tablet, ASPIR-LOW 81 MG TBEC, Disp: , Rfl:   •  atorvastatin (LIPITOR) 40 MG tablet, Take 1 tablet by mouth every night at bedtime., Disp: 90 tablet, Rfl: 1  •  Cholecalciferol (VITAMIN D) 50 MCG (2000 UT) capsule, VITAMIN D 2000 UNIT CAPS, Disp: , Rfl:   •  lisinopril (PRINIVIL,ZESTRIL) 40 MG tablet, Take 1 tablet by mouth Daily., Disp: 90 tablet, Rfl: 1  •  metFORMIN ER (GLUCOPHAGE-XR) 500 MG 24 hr tablet, METFORMIN HCL  MG XR24H-TAB, Disp: , Rfl:     Aspirin use counseling: Does not need ASA (and currently is  "not on it)    Current medication list contains no high risk medications.  No harmful drug interactions have been identified.     Family History   Problem Relation Age of Onset   • Diabetes Mother    • Heart disease Mother    • Hypertension Mother    • Heart disease Father        Social History     Tobacco Use   • Smoking status: Never Smoker   • Smokeless tobacco: Never Used   Substance Use Topics   • Alcohol use: Yes       Past Surgical History:   Procedure Laterality Date   • COLONOSCOPY  2008   • TONSILLECTOMY     • UMBILICAL HERNIA REPAIR  07/2018   • VASECTOMY         Patient Active Problem List   Diagnosis   • Asthma   • Benign prostatic hyperplasia   • Cardiomegaly   • Medicare annual wellness visit, initial   • Gastroesophageal reflux disease   • Hiatal hernia   • High prostate specific antigen (PSA)   • Hyperlipidemia   • Hypertension   • Prediabetes   • Vitamin D deficiency   • Colon cancer screening   • Stage 3a chronic kidney disease (CMS/HCC)       Review of Systems   Constitutional: Negative for activity change, fatigue and fever.   Respiratory: Negative for cough, shortness of breath and wheezing.    Cardiovascular: Negative for chest pain, palpitations and leg swelling.   Gastrointestinal: Negative for constipation, diarrhea and indigestion.   Skin: Negative for color change, dry skin and rash.   Neurological: Negative for tremors and headache.       Objective     Vitals:    06/11/21 0810   BP: 110/66   BP Location: Right arm   Patient Position: Sitting   Cuff Size: Adult   Pulse: 102   Resp: 16   Temp: 97.3 °F (36.3 °C)   TempSrc: Temporal   SpO2: 95%   Weight: 87.1 kg (192 lb)   Height: 177.8 cm (70\")   PainSc: 0-No pain       Patient's Body mass index is 27.55 kg/m². indicating that he is overweight (BMI 25-29.9). Obesity-related health conditions include the following: hypertension and dyslipidemias. Obesity is unchanged. BMI is is above average; BMI management plan is completed. We discussed " portion control and increasing exercise..      No exam data present    The patient has no evidence of cognitve impairment.     Physical Exam  Vitals and nursing note reviewed.   Constitutional:       General: He is not in acute distress.     Appearance: Normal appearance. He is well-developed. He is not ill-appearing.   HENT:      Head: Normocephalic and atraumatic.   Cardiovascular:      Rate and Rhythm: Normal rate and regular rhythm.      Heart sounds: Normal heart sounds. No murmur heard.   No gallop.    Pulmonary:      Effort: Pulmonary effort is normal. No respiratory distress.      Breath sounds: Normal breath sounds. No wheezing, rhonchi or rales.   Chest:      Chest wall: No tenderness.   Musculoskeletal:      Cervical back: Normal range of motion and neck supple.   Neurological:      General: No focal deficit present.      Mental Status: He is alert and oriented to person, place, and time. Mental status is at baseline.   Psychiatric:         Mood and Affect: Mood normal.         Recent Lab Results:     Lab Results   Component Value Date    CHOL 137 06/05/2020    TRIG 117 06/05/2020    HDL 54 06/05/2020    VLDL 23.4 06/05/2020    LDLHDL 1.10 06/05/2020       Assessment/Plan   Age-appropriate Screening Schedule:  Refer to the list below for future screening recommendations based on patient's age, sex and/or medical conditions.      Health Maintenance   Topic Date Due   • ZOSTER VACCINE (2 of 3) 11/28/2017   • LIPID PANEL  06/05/2021   • INFLUENZA VACCINE  08/01/2021   • TDAP/TD VACCINES (2 - Td or Tdap) 12/04/2025       Medicare Risks and Personalized Health Plan:  Advance Directive Discussion  Cardiovascular risk  Colon Cancer Screening  Dementia/Memory   Depression/Dysphoria  Diabetic Lab Screening   Fall Risk  Immunizations Discussed/Encouraged (specific immunizations; Shingrix )  Prostate Cancer Screening       CMS-Preventive Services Quick Reference  Medicare Preventive Services Addressed:  Annual  Wellness Visit (AWV)  Bone Density Measurements  Cardiovascular Disease Screening Tests (may do this order every 5 years in beneficiaries without signs or symptoms of cardiovascular disease)  Colorectal Cancer Screening, Colonoscopy  Prostate Cancer Screening     Advance Care Planning:  ACP discussion was held with the patient during this visit. Patient has an advance directive (not in EMR), copy requested.    Diagnoses and all orders for this visit:    1. Medicare annual wellness visit, initial (Primary)    2. Stage 3a chronic kidney disease (CMS/HCC)  -     CBC Auto Differential  -     Comprehensive Metabolic Panel  -     Urinalysis With Culture If Indicated - Urine, Clean Catch    3. Essential hypertension  -     CBC Auto Differential  -     Comprehensive Metabolic Panel  -     Lipid Panel  -     TSH    4. Mixed hyperlipidemia  -     Comprehensive Metabolic Panel  -     Lipid Panel  -     TSH    5. Prediabetes  -     Hemoglobin A1c    6. Benign prostatic hyperplasia without lower urinary tract symptoms  -     PSA DIAGNOSTIC; Future    7. Colon cancer screening  -     Cologuard - Stool, Per Rectum; Future      Medicare wellness exam was done today.  I reviewed his medical problems and his medications.  I will be getting fasting blood work.  I reviewed his health maintenance.  The order for Cologuard was given in the past, but the sample was not processed according my records.  I gave him a new order and he is to schedule the test.  He does not want to get colonoscopy and that is the reason we are proceeding with Cologuard.  I also reviewed his immunization.  He is already up to speed with both of his pneumonia shots and he completed his COVID-19 vaccination series.  He had Zostavax in the past and we also discussed Shingrix, which was recommended and he was advised to check with his pharmacy.  Healthy lifestyle was discussed and reinforced.    An After Visit Summary and PPPS with all of these plans were given to  the patient.      Follow Up:  Return in about 6 months (around 12/11/2021) for Next scheduled follow up.        Requested Prescriptions      No prescriptions requested or ordered in this encounter

## 2021-07-21 DIAGNOSIS — E78.2 MIXED HYPERLIPIDEMIA: ICD-10-CM

## 2021-07-21 DIAGNOSIS — I10 ESSENTIAL HYPERTENSION: ICD-10-CM

## 2021-07-21 RX ORDER — ATORVASTATIN CALCIUM 40 MG/1
TABLET, FILM COATED ORAL
Qty: 90 TABLET | Refills: 1 | Status: SHIPPED | OUTPATIENT
Start: 2021-07-21 | End: 2022-01-20

## 2021-07-21 RX ORDER — LISINOPRIL 40 MG/1
TABLET ORAL
Qty: 90 TABLET | Refills: 1 | Status: SHIPPED | OUTPATIENT
Start: 2021-07-21 | End: 2022-01-20

## 2021-12-13 ENCOUNTER — OFFICE VISIT (OUTPATIENT)
Dept: FAMILY MEDICINE CLINIC | Facility: CLINIC | Age: 66
End: 2021-12-13

## 2021-12-13 ENCOUNTER — LAB (OUTPATIENT)
Dept: FAMILY MEDICINE CLINIC | Facility: CLINIC | Age: 66
End: 2021-12-13

## 2021-12-13 VITALS
RESPIRATION RATE: 16 BRPM | HEART RATE: 108 BPM | HEIGHT: 70 IN | WEIGHT: 190.6 LBS | BODY MASS INDEX: 27.29 KG/M2 | OXYGEN SATURATION: 98 % | SYSTOLIC BLOOD PRESSURE: 113 MMHG | DIASTOLIC BLOOD PRESSURE: 70 MMHG | TEMPERATURE: 98.2 F

## 2021-12-13 DIAGNOSIS — R73.03 PREDIABETES: ICD-10-CM

## 2021-12-13 DIAGNOSIS — N40.0 BENIGN PROSTATIC HYPERPLASIA WITHOUT LOWER URINARY TRACT SYMPTOMS: ICD-10-CM

## 2021-12-13 DIAGNOSIS — N18.31 STAGE 3A CHRONIC KIDNEY DISEASE (HCC): ICD-10-CM

## 2021-12-13 DIAGNOSIS — J45.20 MILD INTERMITTENT ASTHMA, UNSPECIFIED WHETHER COMPLICATED: ICD-10-CM

## 2021-12-13 DIAGNOSIS — I10 PRIMARY HYPERTENSION: Primary | ICD-10-CM

## 2021-12-13 DIAGNOSIS — E78.2 MIXED HYPERLIPIDEMIA: ICD-10-CM

## 2021-12-13 LAB
ALBUMIN SERPL-MCNC: 4.3 G/DL (ref 3.5–5.2)
ALBUMIN/GLOB SERPL: 1.3 G/DL
ALP SERPL-CCNC: 107 U/L (ref 39–117)
ALT SERPL W P-5'-P-CCNC: 21 U/L (ref 1–41)
ANION GAP SERPL CALCULATED.3IONS-SCNC: 12 MMOL/L (ref 5–15)
AST SERPL-CCNC: 15 U/L (ref 1–40)
BASOPHILS # BLD AUTO: 0.09 10*3/MM3 (ref 0–0.2)
BASOPHILS NFR BLD AUTO: 0.6 % (ref 0–1.5)
BILIRUB SERPL-MCNC: 0.3 MG/DL (ref 0–1.2)
BUN SERPL-MCNC: 22 MG/DL (ref 8–23)
BUN/CREAT SERPL: 14.9 (ref 7–25)
CALCIUM SPEC-SCNC: 10.2 MG/DL (ref 8.6–10.5)
CHLORIDE SERPL-SCNC: 102 MMOL/L (ref 98–107)
CHOLEST SERPL-MCNC: 114 MG/DL (ref 0–200)
CO2 SERPL-SCNC: 23 MMOL/L (ref 22–29)
CREAT SERPL-MCNC: 1.48 MG/DL (ref 0.76–1.27)
DEPRECATED RDW RBC AUTO: 47.9 FL (ref 37–54)
EOSINOPHIL # BLD AUTO: 0.14 10*3/MM3 (ref 0–0.4)
EOSINOPHIL NFR BLD AUTO: 1 % (ref 0.3–6.2)
ERYTHROCYTE [DISTWIDTH] IN BLOOD BY AUTOMATED COUNT: 17.4 % (ref 12.3–15.4)
GFR SERPL CREATININE-BSD FRML MDRD: 47 ML/MIN/1.73
GLOBULIN UR ELPH-MCNC: 3.4 GM/DL
GLUCOSE SERPL-MCNC: 116 MG/DL (ref 65–99)
HBA1C MFR BLD: 6.5 % (ref 3.5–5.6)
HCT VFR BLD AUTO: 35 % (ref 37.5–51)
HDLC SERPL-MCNC: 46 MG/DL (ref 40–60)
HGB BLD-MCNC: 9.8 G/DL (ref 13–17.7)
LDLC SERPL CALC-MCNC: 47 MG/DL (ref 0–100)
LDLC/HDLC SERPL: 0.99 {RATIO}
LYMPHOCYTES # BLD AUTO: 1.87 10*3/MM3 (ref 0.7–3.1)
LYMPHOCYTES NFR BLD AUTO: 13 % (ref 19.6–45.3)
MCH RBC QN AUTO: 21.6 PG (ref 26.6–33)
MCHC RBC AUTO-ENTMCNC: 28 G/DL (ref 31.5–35.7)
MCV RBC AUTO: 77.1 FL (ref 79–97)
MONOCYTES # BLD AUTO: 1.35 10*3/MM3 (ref 0.1–0.9)
MONOCYTES NFR BLD AUTO: 9.4 % (ref 5–12)
NEUTROPHILS NFR BLD AUTO: 10.89 10*3/MM3 (ref 1.7–7)
NEUTROPHILS NFR BLD AUTO: 75.4 % (ref 42.7–76)
PLATELET # BLD AUTO: 438 10*3/MM3 (ref 140–450)
PMV BLD AUTO: 10.6 FL (ref 6–12)
POTASSIUM SERPL-SCNC: 4.7 MMOL/L (ref 3.5–5.2)
PROT SERPL-MCNC: 7.7 G/DL (ref 6–8.5)
PSA SERPL-MCNC: 2.68 NG/ML (ref 0–4)
RBC # BLD AUTO: 4.54 10*6/MM3 (ref 4.14–5.8)
SODIUM SERPL-SCNC: 137 MMOL/L (ref 136–145)
TRIGL SERPL-MCNC: 113 MG/DL (ref 0–150)
VLDLC SERPL-MCNC: 21 MG/DL (ref 5–40)
WBC NRBC COR # BLD: 14.42 10*3/MM3 (ref 3.4–10.8)

## 2021-12-13 PROCEDURE — 84466 ASSAY OF TRANSFERRIN: CPT | Performed by: FAMILY MEDICINE

## 2021-12-13 PROCEDURE — 80053 COMPREHEN METABOLIC PANEL: CPT | Performed by: FAMILY MEDICINE

## 2021-12-13 PROCEDURE — 83540 ASSAY OF IRON: CPT | Performed by: FAMILY MEDICINE

## 2021-12-13 PROCEDURE — 80061 LIPID PANEL: CPT | Performed by: FAMILY MEDICINE

## 2021-12-13 PROCEDURE — 85025 COMPLETE CBC W/AUTO DIFF WBC: CPT | Performed by: FAMILY MEDICINE

## 2021-12-13 PROCEDURE — 84153 ASSAY OF PSA TOTAL: CPT | Performed by: FAMILY MEDICINE

## 2021-12-13 PROCEDURE — 99214 OFFICE O/P EST MOD 30 MIN: CPT | Performed by: FAMILY MEDICINE

## 2021-12-13 PROCEDURE — 83036 HEMOGLOBIN GLYCOSYLATED A1C: CPT | Performed by: FAMILY MEDICINE

## 2021-12-13 PROCEDURE — 36415 COLL VENOUS BLD VENIPUNCTURE: CPT | Performed by: FAMILY MEDICINE

## 2021-12-13 PROCEDURE — 82728 ASSAY OF FERRITIN: CPT | Performed by: FAMILY MEDICINE

## 2021-12-13 RX ORDER — ALBUTEROL SULFATE 90 UG/1
2 AEROSOL, METERED RESPIRATORY (INHALATION)
Qty: 8 G | Refills: 1 | Status: SHIPPED | OUTPATIENT
Start: 2021-12-13

## 2021-12-13 NOTE — PROGRESS NOTES
Subjective   Chief Complaint   Patient presents with   • Follow-up     6 month   • Med Refill   • Diabetes   • Hypertension   • Hyperlipidemia     Antonio De Dios is a 66 y.o. male.     Patient Care Team:  Smiley Lu MD as PCP - General (Family Medicine)    He is coming in today to follow-up on his medical problems and his medications.  He is currently being treated for hypertension, hyperlipidemia, prediabetes, asthma, he was noted to have elevated PSA due to BPH in the past, he was advised to follow-up with urologist repeatedly, but he still has not done it.  He tells me that he has not been taking Metformin very much as he is concerned that this medication can cause kidney damage as he had 2 friends who ended up on dialysis after being on Metformin.  He was previously given order for the [employer], he tells me that he collected the sample and even received the result from the company saying that it was negative, however we do not have records of it.  He also tells me that few months ago when he was traveling out of town he forgot all of his pills at home and went 1 week without any of his pills and he simply felt great being off medicine and he wonders if some of his medications are causing some fatigue.  He stays pretty active, he plays golf regularly.       The following portions of the patient's history were reviewed and updated as appropriate: allergies, current medications, past family history, past medical history, past social history, past surgical history and problem list.  Past Medical History:   Diagnosis Date   • Asthma    • BPH (benign prostatic hyperplasia)    • Elevated PSA    • GERD (gastroesophageal reflux disease)    • Hyperlipidemia    • Hypertension    • Prediabetes    • Vitamin D deficiency      Past Surgical History:   Procedure Laterality Date   • COLONOSCOPY  2008   • TONSILLECTOMY     • UMBILICAL HERNIA REPAIR  07/2018   • VASECTOMY       The patient has a family history of  Family  "History   Problem Relation Age of Onset   • Diabetes Mother    • Heart disease Mother    • Hypertension Mother    • Heart disease Father      Social History     Socioeconomic History   • Marital status:    Tobacco Use   • Smoking status: Never Smoker   • Smokeless tobacco: Never Used   Substance and Sexual Activity   • Alcohol use: Yes   • Drug use: No   • Sexual activity: Defer       Review of Systems   Constitutional: Negative for activity change, fatigue and fever.   Respiratory: Negative for cough, shortness of breath and wheezing.    Cardiovascular: Negative for chest pain, palpitations and leg swelling.   Gastrointestinal: Negative for constipation, diarrhea and indigestion.   Skin: Negative for color change, dry skin and rash.   Neurological: Negative for tremors and headache.     Visit Vitals  /70 (BP Location: Right arm, Patient Position: Sitting, Cuff Size: Adult)   Pulse 108   Temp 98.2 °F (36.8 °C)   Resp 16   Ht 177.8 cm (70\")   Wt 86.5 kg (190 lb 9.6 oz)   SpO2 98%   BMI 27.35 kg/m²       Current Outpatient Medications:   •  albuterol sulfate HFA (ProAir HFA) 108 (90 Base) MCG/ACT inhaler, Inhale 2 puffs 4 (Four) Times a Day., Disp: 8 g, Rfl: 1  •  aspirin (ASPIR-LOW) 81 MG EC tablet, ASPIR-LOW 81 MG TBEC, Disp: , Rfl:   •  atorvastatin (LIPITOR) 40 MG tablet, TAKE 1 TABLET BY MOUTH EVERYDAY AT BEDTIME, Disp: 90 tablet, Rfl: 1  •  Cholecalciferol (VITAMIN D) 50 MCG (2000 UT) capsule, VITAMIN D 2000 UNIT CAPS, Disp: , Rfl:   •  lisinopril (PRINIVIL,ZESTRIL) 40 MG tablet, TAKE 1 TABLET BY MOUTH EVERY DAY, Disp: 90 tablet, Rfl: 1    Objective   Physical Exam  Vitals and nursing note reviewed.   Constitutional:       General: He is not in acute distress.     Appearance: Normal appearance. He is well-developed. He is not ill-appearing.   HENT:      Head: Normocephalic and atraumatic.   Cardiovascular:      Rate and Rhythm: Normal rate and regular rhythm.      Heart sounds: Normal heart sounds. " No murmur heard.  No gallop.    Pulmonary:      Effort: Pulmonary effort is normal. No respiratory distress.      Breath sounds: Normal breath sounds. No wheezing, rhonchi or rales.   Chest:      Chest wall: No tenderness.   Musculoskeletal:      Cervical back: Normal range of motion and neck supple.   Neurological:      General: No focal deficit present.      Mental Status: He is alert and oriented to person, place, and time. Mental status is at baseline.   Psychiatric:         Mood and Affect: Mood normal.         No visits with results within 7 Day(s) from this visit.   Latest known visit with results is:   Lab on 06/11/2021   Component Date Value Ref Range Status   • PSA 06/11/2021 4.590* 0.000 - 4.000 ng/mL Final       FOLLOWING LABS WERE REVIEWED TODAY:  CMP    CMP 6/11/21   Glucose 121 (A)   BUN 13   Creatinine 1.45 (A)   eGFR Non African Am 49 (A)   Sodium 136   Potassium 3.8   Chloride 100   Calcium 10.2   Albumin 4.40   Total Bilirubin 0.4   Alkaline Phosphatase 104   AST (SGOT) 18   ALT (SGPT) 27   (A) Abnormal value            CBC    CBC 6/11/21   WBC 12.61 (A)   RBC 5.42   Hemoglobin 13.6   Hematocrit 43.6   MCV 80.4   MCH 25.1 (A)   MCHC 31.2 (A)   RDW 16.8 (A)   Platelets 384   (A) Abnormal value            Lipid Panel    Lipid Panel 6/11/21   Total Cholesterol 133   Triglycerides 101   HDL Cholesterol 54   VLDL Cholesterol 19   LDL Cholesterol  60   LDL/HDL Ratio 1.09           Most Recent A1C    HGBA1C Most Recent 6/11/21   Hemoglobin A1C 6.5 (A)   (A) Abnormal value              Assessment/Plan   Diagnoses and all orders for this visit:    1. Primary hypertension (Primary)  -     Comprehensive Metabolic Panel  -     Lipid Panel  -     CBC Auto Differential    2. Mixed hyperlipidemia  -     Comprehensive Metabolic Panel  -     Lipid Panel    3. Prediabetes  -     Hemoglobin A1c    4. Stage 3a chronic kidney disease (HCC)  -     CBC Auto Differential    5. Benign prostatic hyperplasia without lower  urinary tract symptoms  -     PSA DIAGNOSTIC; Future    6. Mild intermittent asthma, unspecified whether complicated  -     albuterol sulfate HFA (ProAir HFA) 108 (90 Base) MCG/ACT inhaler; Inhale 2 puffs 4 (Four) Times a Day.  Dispense: 8 g; Refill: 1      I reviewed his medical problems and his medications.  I also reviewed his previous labs from 6/2021.  His PSA was elevated and he was advised to follow-up with a urologist repeatedly, he still has not done so.  His creatinine stays slightly elevated consistent with chronic kidney disease stage III.  He was previously given order for the renal ultrasound, but he still did not schedule that.  Order for Cologuard was also given in the past, we will be contacting the company for the result as he tells me that the sample was collected, but I do not have any records of the result.  Due to his reported fatigue and feeling better off medication I will be discontinuing his amlodipine as his blood pressure is somehow low and may be the cause of the fatigue.  I also discussed with him that atorvastatin may possibly cause some fatigue.  He discontinued Metformin a while back as he is concerned about the kidney side effects.  I discussed with him that medication certainly can cause some side effects including kidney side effects, but uncontrolled medical issues like hypertension and diabetes can cause organ damage as well.  He is fully vaccinated against COVID-19.  Fasting blood work is being done today.        Return in about 6 months (around 6/13/2022) for Medicare Wellness.    Requested Prescriptions     Signed Prescriptions Disp Refills   • albuterol sulfate HFA (ProAir HFA) 108 (90 Base) MCG/ACT inhaler 8 g 1     Sig: Inhale 2 puffs 4 (Four) Times a Day.

## 2021-12-14 ENCOUNTER — TELEPHONE (OUTPATIENT)
Dept: FAMILY MEDICINE CLINIC | Facility: CLINIC | Age: 66
End: 2021-12-14

## 2021-12-14 DIAGNOSIS — D50.9 IRON DEFICIENCY ANEMIA, UNSPECIFIED IRON DEFICIENCY ANEMIA TYPE: Primary | ICD-10-CM

## 2021-12-14 LAB
FERRITIN SERPL-MCNC: 8.02 NG/ML (ref 30–400)
IRON 24H UR-MRATE: 19 MCG/DL (ref 59–158)
IRON SATN MFR SERPL: 4 % (ref 20–50)
TIBC SERPL-MCNC: 514 MCG/DL (ref 298–536)
TRANSFERRIN SERPL-MCNC: 345 MG/DL (ref 200–360)

## 2021-12-14 RX ORDER — FERROUS SULFATE 325(65) MG
325 TABLET ORAL
Qty: 90 TABLET | Refills: 0 | Status: SHIPPED | OUTPATIENT
Start: 2021-12-14 | End: 2022-03-08 | Stop reason: SDUPTHER

## 2022-01-20 DIAGNOSIS — I10 ESSENTIAL HYPERTENSION: ICD-10-CM

## 2022-01-20 DIAGNOSIS — E78.2 MIXED HYPERLIPIDEMIA: ICD-10-CM

## 2022-01-20 RX ORDER — ATORVASTATIN CALCIUM 40 MG/1
TABLET, FILM COATED ORAL
Qty: 90 TABLET | Refills: 1 | Status: SHIPPED | OUTPATIENT
Start: 2022-01-20 | End: 2022-06-13 | Stop reason: SDUPTHER

## 2022-01-20 RX ORDER — LISINOPRIL 40 MG/1
TABLET ORAL
Qty: 90 TABLET | Refills: 1 | Status: SHIPPED | OUTPATIENT
Start: 2022-01-20 | End: 2022-06-13 | Stop reason: SDUPTHER

## 2022-03-08 RX ORDER — FERROUS SULFATE 325(65) MG
TABLET ORAL
Qty: 90 TABLET | Refills: 0 | Status: SHIPPED | OUTPATIENT
Start: 2022-03-08 | End: 2023-01-06 | Stop reason: SDUPTHER

## 2022-06-13 ENCOUNTER — OFFICE VISIT (OUTPATIENT)
Dept: FAMILY MEDICINE CLINIC | Facility: CLINIC | Age: 67
End: 2022-06-13

## 2022-06-13 VITALS
SYSTOLIC BLOOD PRESSURE: 150 MMHG | TEMPERATURE: 98 F | HEIGHT: 70 IN | BODY MASS INDEX: 27.86 KG/M2 | DIASTOLIC BLOOD PRESSURE: 97 MMHG | OXYGEN SATURATION: 96 % | HEART RATE: 103 BPM | WEIGHT: 194.6 LBS | RESPIRATION RATE: 16 BRPM

## 2022-06-13 DIAGNOSIS — D50.9 IRON DEFICIENCY ANEMIA, UNSPECIFIED IRON DEFICIENCY ANEMIA TYPE: ICD-10-CM

## 2022-06-13 DIAGNOSIS — R73.03 PREDIABETES: ICD-10-CM

## 2022-06-13 DIAGNOSIS — I10 ESSENTIAL HYPERTENSION: ICD-10-CM

## 2022-06-13 DIAGNOSIS — E78.2 MIXED HYPERLIPIDEMIA: ICD-10-CM

## 2022-06-13 DIAGNOSIS — I10 PRIMARY HYPERTENSION: ICD-10-CM

## 2022-06-13 DIAGNOSIS — Z00.00 MEDICARE ANNUAL WELLNESS VISIT, SUBSEQUENT: Primary | ICD-10-CM

## 2022-06-13 PROCEDURE — 1160F RVW MEDS BY RX/DR IN RCRD: CPT | Performed by: FAMILY MEDICINE

## 2022-06-13 PROCEDURE — 1170F FXNL STATUS ASSESSED: CPT | Performed by: FAMILY MEDICINE

## 2022-06-13 PROCEDURE — G0439 PPPS, SUBSEQ VISIT: HCPCS | Performed by: FAMILY MEDICINE

## 2022-06-13 RX ORDER — ATORVASTATIN CALCIUM 40 MG/1
40 TABLET, FILM COATED ORAL
Qty: 90 TABLET | Refills: 1 | Status: SHIPPED | OUTPATIENT
Start: 2022-06-13 | End: 2022-12-08

## 2022-06-13 RX ORDER — METFORMIN HYDROCHLORIDE 500 MG/1
500 TABLET, EXTENDED RELEASE ORAL
Qty: 90 TABLET | Refills: 1 | Status: SHIPPED | OUTPATIENT
Start: 2022-06-13 | End: 2022-12-08

## 2022-06-13 RX ORDER — LISINOPRIL 40 MG/1
40 TABLET ORAL DAILY
Qty: 90 TABLET | Refills: 1 | Status: SHIPPED | OUTPATIENT
Start: 2022-06-13 | End: 2022-12-08

## 2022-06-13 NOTE — PROGRESS NOTES
Subsequent Medicare Wellness Visit   The ABC's of the Annual Wellness Visit    Chief Complaint   Patient presents with   • Medicare Wellness-subsequent       HPI:  Antonio De Dios, -1955, is a 66 y.o. male who presents for a Subsequent Medicare Wellness Visit.  He reports to be doing overall well.  He is retired, but he is back at work and part-time 2 days a week.  He denies any issues with memory or depressive symptoms.  He lives by himself and he is fully independent with his activities of daily living.  He tells me that for the longest time he used to take aspirin 325 mg a day, sometimes in January of this year he went down to low-dose aspirin at 81 mg a day.  That significantly help with his indigestion and he has not had any GI issues since then.  He has not scheduled his colonoscopy and has not proceeded with Cologuard and he tells me that he does not really want any testing like that.  He did not go to see the GI doctor as advised back in 2021 due to iron deficiency anemia.  He reports having good appetite and denies any abdominal symptoms.    Recent Hospitalizations:  No hospitalization(s) within the last year..    Current Medical Providers:  Patient Care Team:  Smiley Lu MD as PCP - General (Family Medicine)    Health Habits and Functional and Cognitive Screening and Depression Screening:  Functional & Cognitive Status 2022   Do you have difficulty preparing food and eating? No   Do you have difficulty bathing yourself, getting dressed or grooming yourself? No   Do you have difficulty using the toilet? No   Do you have difficulty moving around from place to place? No   Do you have trouble with steps or getting out of a bed or a chair? No   Current Diet Well Balanced Diet   Dental Exam Up to date   Eye Exam Up to date   Exercise (times per week) 0 times per week   Current Exercises Include No Regular Exercise   Current Exercise Activities Include -   Do you need help using the phone?  No    Are you deaf or do you have serious difficulty hearing?  No   Do you need help with transportation? No   Do you need help shopping? No   Do you need help preparing meals?  No   Do you need help with housework?  No   Do you need help with laundry? No   Do you need help taking your medications? No   Do you need help managing money? No   Do you ever drive or ride in a car without wearing a seat belt? No   Have you felt unusual stress, anger or loneliness in the last month? No   Who do you live with? Alone   If you need help, do you have trouble finding someone available to you? No   Have you been bothered in the last four weeks by sexual problems? -   Do you have difficulty concentrating, remembering or making decisions? No       Compared to one year ago, the patient feels his physical health is the same and his mental health is the same.    Depression Screen:  PHQ-2/PHQ-9 Depression Screening 6/13/2022   Retired PHQ-9 Total Score -   Retired Total Score -   Little Interest or Pleasure in Doing Things 0-->not at all   Feeling Down, Depressed or Hopeless 0-->not at all   PHQ-9: Brief Depression Severity Measure Score 0         Past Medical/Family/Social History:  The following portions of the patient's history were reviewed and updated as appropriate: allergies, current medications, past family history, past medical history, past social history, past surgical history and problem list.    No Known Allergies      Current Outpatient Medications:   •  atorvastatin (LIPITOR) 40 MG tablet, Take 1 tablet by mouth every night at bedtime., Disp: 90 tablet, Rfl: 1  •  lisinopril (PRINIVIL,ZESTRIL) 40 MG tablet, Take 1 tablet by mouth Daily., Disp: 90 tablet, Rfl: 1  •  albuterol sulfate HFA (ProAir HFA) 108 (90 Base) MCG/ACT inhaler, Inhale 2 puffs 4 (Four) Times a Day., Disp: 8 g, Rfl: 1  •  aspirin (ASPIR-LOW) 81 MG EC tablet, ASPIR-LOW 81 MG TBEC, Disp: , Rfl:   •  Cholecalciferol (VITAMIN D) 50 MCG (2000 UT) capsule,  VITAMIN D 2000 UNIT CAPS, Disp: , Rfl:   •  ferrous sulfate 325 (65 FE) MG tablet, TAKE 1 TABLET BY MOUTH EVERY DAY WITH BREAKFAST, Disp: 90 tablet, Rfl: 0  •  metFORMIN ER (GLUCOPHAGE-XR) 500 MG 24 hr tablet, Take 1 tablet by mouth Daily With Breakfast., Disp: 90 tablet, Rfl: 1    Aspirin use counseling: Does not need ASA (and currently is not on it)    Current medication list contains no high risk medications.  No harmful drug interactions have been identified.     Family History   Problem Relation Age of Onset   • Diabetes Mother    • Heart disease Mother    • Hypertension Mother    • Heart disease Father        Social History     Tobacco Use   • Smoking status: Never Smoker   • Smokeless tobacco: Never Used   Substance Use Topics   • Alcohol use: Yes       Past Surgical History:   Procedure Laterality Date   • COLONOSCOPY  2008   • TONSILLECTOMY     • UMBILICAL HERNIA REPAIR  07/2018   • VASECTOMY         Patient Active Problem List   Diagnosis   • Asthma   • Benign prostatic hyperplasia   • Cardiomegaly   • Gastroesophageal reflux disease   • Hiatal hernia   • High prostate specific antigen (PSA)   • Hyperlipidemia   • Hypertension   • Prediabetes   • Vitamin D deficiency   • Colon cancer screening   • Stage 3a chronic kidney disease (HCC)   • Medicare annual wellness visit, subsequent   • Iron deficiency anemia   • Essential hypertension       Review of Systems   Constitutional: Negative for activity change, fatigue and fever.   Respiratory: Negative for cough, shortness of breath and wheezing.    Cardiovascular: Negative for chest pain, palpitations and leg swelling.   Gastrointestinal: Negative for constipation, diarrhea and indigestion.   Skin: Negative for color change, dry skin and rash.   Neurological: Negative for tremors and headache.       Objective     Vitals:    06/13/22 0918   BP: 150/97   BP Location: Left arm   Patient Position: Sitting   Cuff Size: Adult   Pulse: 103   Resp: 16   Temp: 98 °F  "(36.7 °C)   SpO2: 96%   Weight: 88.3 kg (194 lb 9.6 oz)   Height: 177.8 cm (70\")   PainSc: 0-No pain       BMI is >= 25 and <30. (Overweight) The following options were offered after discussion;: exercise counseling/recommendations      No exam data present    The patient has no evidence of cognitve impairment.     Physical Exam  Vitals and nursing note reviewed.   Constitutional:       General: He is not in acute distress.     Appearance: Normal appearance. He is well-developed. He is not ill-appearing.   HENT:      Head: Normocephalic and atraumatic.   Cardiovascular:      Rate and Rhythm: Normal rate and regular rhythm.      Heart sounds: Normal heart sounds. No murmur heard.    No gallop.   Pulmonary:      Effort: Pulmonary effort is normal. No respiratory distress.      Breath sounds: Normal breath sounds. No wheezing, rhonchi or rales.   Chest:      Chest wall: No tenderness.   Musculoskeletal:      Cervical back: Normal range of motion and neck supple.   Neurological:      General: No focal deficit present.      Mental Status: He is alert and oriented to person, place, and time. Mental status is at baseline.   Psychiatric:         Mood and Affect: Mood normal.         Recent Lab Results:     Lab Results   Component Value Date    CHOL 114 12/13/2021    TRIG 113 12/13/2021    HDL 46 12/13/2021    VLDL 21 12/13/2021    LDLHDL 0.99 12/13/2021       Assessment & Plan   Age-appropriate Screening Schedule:  Refer to the list below for future screening recommendations based on patient's age, sex and/or medical conditions.      Health Maintenance   Topic Date Due   • ZOSTER VACCINE (2 of 3) 11/28/2017   • INFLUENZA VACCINE  08/01/2022   • LIPID PANEL  12/13/2022   • TDAP/TD VACCINES (2 - Td or Tdap) 12/04/2025       Medicare Risks and Personalized Health Plan:  Advance Directive Discussion  Cardiovascular risk  Colon Cancer Screening  Dementia/Memory   Depression/Dysphoria  Diabetic Lab Screening   Fall Risk  Prostate " Cancer Screening       CMS-Preventive Services Quick Reference  Medicare Preventive Services Addressed:  Annual Wellness Visit (AWV)  Bone Density Measurements  Cardiovascular Disease Screening Tests (may do this order every 5 years in beneficiaries without signs or symptoms of cardiovascular disease)  Prostate Cancer Screening     Advance Care Planning:  ACP discussion was held with the patient during this visit. Patient has an advance directive in EMR which is still valid.     Diagnoses and all orders for this visit:    1. Medicare annual wellness visit, subsequent (Primary)    2. Prediabetes  -     Hemoglobin A1c  -     metFORMIN ER (GLUCOPHAGE-XR) 500 MG 24 hr tablet; Take 1 tablet by mouth Daily With Breakfast.  Dispense: 90 tablet; Refill: 1    3. Primary hypertension  -     Comprehensive Metabolic Panel  -     Cancel: Lipid Panel    4. Mixed hyperlipidemia  -     Comprehensive Metabolic Panel  -     Cancel: Lipid Panel  -     atorvastatin (LIPITOR) 40 MG tablet; Take 1 tablet by mouth every night at bedtime.  Dispense: 90 tablet; Refill: 1    5. Iron deficiency anemia, unspecified iron deficiency anemia type  -     CBC Auto Differential  -     Ferritin  -     Iron Profile    6. Essential hypertension  -     lisinopril (PRINIVIL,ZESTRIL) 40 MG tablet; Take 1 tablet by mouth Daily.  Dispense: 90 tablet; Refill: 1    Medicare wellness exam was done today.  I reviewed his medical problems and his medications.  I also reviewed his previous fasting blood work from 12/2021.  At that time iron deficiency anemia was noted with hemoglobin of 9.8.  Patient was referred to see the GI, but he failed to keep that appointment.  He is not up to speed with colonoscopy as he previously refused the test including Cologuard.  He still is declining.  I expressed my concerns in regards of the anemia and I will be rechecking the blood work.  He denies any abdominal symptoms or fatigue.  He is vaccinated and boosted against  COVID-19.  Healthy lifestyle was reinforced.    An After Visit Summary and PPPS with all of these plans were given to the patient.      Follow Up:  Return in about 6 months (around 12/13/2022) for Next scheduled follow up.        Requested Prescriptions     Signed Prescriptions Disp Refills   • lisinopril (PRINIVIL,ZESTRIL) 40 MG tablet 90 tablet 1     Sig: Take 1 tablet by mouth Daily.   • atorvastatin (LIPITOR) 40 MG tablet 90 tablet 1     Sig: Take 1 tablet by mouth every night at bedtime.   • metFORMIN ER (GLUCOPHAGE-XR) 500 MG 24 hr tablet 90 tablet 1     Sig: Take 1 tablet by mouth Daily With Breakfast.

## 2022-12-08 DIAGNOSIS — I10 ESSENTIAL HYPERTENSION: ICD-10-CM

## 2022-12-08 DIAGNOSIS — E78.2 MIXED HYPERLIPIDEMIA: ICD-10-CM

## 2022-12-08 DIAGNOSIS — R73.03 PREDIABETES: ICD-10-CM

## 2022-12-08 RX ORDER — LISINOPRIL 40 MG/1
TABLET ORAL
Qty: 90 TABLET | Refills: 1 | Status: SHIPPED | OUTPATIENT
Start: 2022-12-08

## 2022-12-08 RX ORDER — METFORMIN HYDROCHLORIDE 500 MG/1
TABLET, EXTENDED RELEASE ORAL
Qty: 90 TABLET | Refills: 1 | Status: SHIPPED | OUTPATIENT
Start: 2022-12-08

## 2022-12-08 RX ORDER — ATORVASTATIN CALCIUM 40 MG/1
TABLET, FILM COATED ORAL
Qty: 90 TABLET | Refills: 1 | Status: SHIPPED | OUTPATIENT
Start: 2022-12-08

## 2023-01-05 ENCOUNTER — OFFICE VISIT (OUTPATIENT)
Dept: FAMILY MEDICINE CLINIC | Facility: CLINIC | Age: 68
End: 2023-01-05
Payer: MEDICARE

## 2023-01-05 VITALS
BODY MASS INDEX: 27.49 KG/M2 | OXYGEN SATURATION: 95 % | HEIGHT: 70 IN | SYSTOLIC BLOOD PRESSURE: 141 MMHG | WEIGHT: 192 LBS | TEMPERATURE: 97.1 F | HEART RATE: 108 BPM | RESPIRATION RATE: 16 BRPM | DIASTOLIC BLOOD PRESSURE: 88 MMHG

## 2023-01-05 DIAGNOSIS — D50.9 IRON DEFICIENCY ANEMIA, UNSPECIFIED IRON DEFICIENCY ANEMIA TYPE: ICD-10-CM

## 2023-01-05 DIAGNOSIS — E78.2 MIXED HYPERLIPIDEMIA: ICD-10-CM

## 2023-01-05 DIAGNOSIS — R73.03 PREDIABETES: ICD-10-CM

## 2023-01-05 DIAGNOSIS — N18.31 STAGE 3A CHRONIC KIDNEY DISEASE: ICD-10-CM

## 2023-01-05 DIAGNOSIS — N40.0 BENIGN PROSTATIC HYPERPLASIA WITHOUT LOWER URINARY TRACT SYMPTOMS: ICD-10-CM

## 2023-01-05 DIAGNOSIS — I10 PRIMARY HYPERTENSION: Primary | ICD-10-CM

## 2023-01-05 LAB
ALBUMIN SERPL-MCNC: 4.3 G/DL (ref 3.5–5.2)
ALBUMIN/GLOB SERPL: 1.2 G/DL
ALP SERPL-CCNC: 137 U/L (ref 39–117)
ALT SERPL W P-5'-P-CCNC: 23 U/L (ref 1–41)
ANION GAP SERPL CALCULATED.3IONS-SCNC: 8 MMOL/L (ref 5–15)
AST SERPL-CCNC: 18 U/L (ref 1–40)
BASOPHILS # BLD AUTO: 0.06 10*3/MM3 (ref 0–0.2)
BASOPHILS NFR BLD AUTO: 0.5 % (ref 0–1.5)
BILIRUB SERPL-MCNC: 0.8 MG/DL (ref 0–1.2)
BILIRUB UR QL STRIP: NEGATIVE
BUN SERPL-MCNC: 12 MG/DL (ref 8–23)
BUN/CREAT SERPL: 8.6 (ref 7–25)
CALCIUM SPEC-SCNC: 9.8 MG/DL (ref 8.6–10.5)
CHLORIDE SERPL-SCNC: 100 MMOL/L (ref 98–107)
CHOLEST SERPL-MCNC: 123 MG/DL (ref 0–200)
CLARITY UR: CLEAR
CO2 SERPL-SCNC: 26 MMOL/L (ref 22–29)
COLOR UR: YELLOW
CREAT SERPL-MCNC: 1.39 MG/DL (ref 0.76–1.27)
DEPRECATED RDW RBC AUTO: 44.1 FL (ref 37–54)
EGFRCR SERPLBLD CKD-EPI 2021: 55.2 ML/MIN/1.73
EOSINOPHIL # BLD AUTO: 0.28 10*3/MM3 (ref 0–0.4)
EOSINOPHIL NFR BLD AUTO: 2.5 % (ref 0.3–6.2)
ERYTHROCYTE [DISTWIDTH] IN BLOOD BY AUTOMATED COUNT: 14.8 % (ref 12.3–15.4)
GLOBULIN UR ELPH-MCNC: 3.5 GM/DL
GLUCOSE SERPL-MCNC: 106 MG/DL (ref 65–99)
GLUCOSE UR STRIP-MCNC: NEGATIVE MG/DL
HBA1C MFR BLD: 6.3 % (ref 3.5–5.6)
HCT VFR BLD AUTO: 47.7 % (ref 37.5–51)
HDLC SERPL-MCNC: 41 MG/DL (ref 40–60)
HGB BLD-MCNC: 15.2 G/DL (ref 13–17.7)
HGB UR QL STRIP.AUTO: NEGATIVE
IMM GRANULOCYTES # BLD AUTO: 0.04 10*3/MM3 (ref 0–0.05)
IMM GRANULOCYTES NFR BLD AUTO: 0.4 % (ref 0–0.5)
IRON 24H UR-MRATE: 49 MCG/DL (ref 59–158)
IRON SATN MFR SERPL: 11 % (ref 20–50)
KETONES UR QL STRIP: NEGATIVE
LDLC SERPL CALC-MCNC: 63 MG/DL (ref 0–100)
LDLC/HDLC SERPL: 1.51 {RATIO}
LEUKOCYTE ESTERASE UR QL STRIP.AUTO: NEGATIVE
LYMPHOCYTES # BLD AUTO: 1.93 10*3/MM3 (ref 0.7–3.1)
LYMPHOCYTES NFR BLD AUTO: 17.4 % (ref 19.6–45.3)
MCH RBC QN AUTO: 26.7 PG (ref 26.6–33)
MCHC RBC AUTO-ENTMCNC: 31.9 G/DL (ref 31.5–35.7)
MCV RBC AUTO: 83.8 FL (ref 79–97)
MONOCYTES # BLD AUTO: 0.83 10*3/MM3 (ref 0.1–0.9)
MONOCYTES NFR BLD AUTO: 7.5 % (ref 5–12)
NEUTROPHILS NFR BLD AUTO: 7.97 10*3/MM3 (ref 1.7–7)
NEUTROPHILS NFR BLD AUTO: 71.7 % (ref 42.7–76)
NITRITE UR QL STRIP: NEGATIVE
NRBC BLD AUTO-RTO: 0 /100 WBC (ref 0–0.2)
PH UR STRIP.AUTO: 7.5 [PH] (ref 5–8)
PLATELET # BLD AUTO: 293 10*3/MM3 (ref 140–450)
PMV BLD AUTO: 10.9 FL (ref 6–12)
POTASSIUM SERPL-SCNC: 4.7 MMOL/L (ref 3.5–5.2)
PROT SERPL-MCNC: 7.8 G/DL (ref 6–8.5)
PROT UR QL STRIP: NEGATIVE
PSA SERPL-MCNC: 1.86 NG/ML (ref 0–4)
RBC # BLD AUTO: 5.69 10*6/MM3 (ref 4.14–5.8)
SODIUM SERPL-SCNC: 134 MMOL/L (ref 136–145)
SP GR UR STRIP: 1.02 (ref 1–1.03)
TIBC SERPL-MCNC: 441 MCG/DL (ref 298–536)
TRANSFERRIN SERPL-MCNC: 296 MG/DL (ref 200–360)
TRIGL SERPL-MCNC: 100 MG/DL (ref 0–150)
TSH SERPL DL<=0.05 MIU/L-ACNC: 3.96 UIU/ML (ref 0.27–4.2)
UROBILINOGEN UR QL STRIP: NORMAL
VIT B12 BLD-MCNC: 764 PG/ML (ref 211–946)
VLDLC SERPL-MCNC: 19 MG/DL (ref 5–40)
WBC NRBC COR # BLD: 11.11 10*3/MM3 (ref 3.4–10.8)

## 2023-01-05 PROCEDURE — 81003 URINALYSIS AUTO W/O SCOPE: CPT | Performed by: FAMILY MEDICINE

## 2023-01-05 PROCEDURE — 85025 COMPLETE CBC W/AUTO DIFF WBC: CPT | Performed by: FAMILY MEDICINE

## 2023-01-05 PROCEDURE — 99214 OFFICE O/P EST MOD 30 MIN: CPT | Performed by: FAMILY MEDICINE

## 2023-01-05 PROCEDURE — 36415 COLL VENOUS BLD VENIPUNCTURE: CPT | Performed by: FAMILY MEDICINE

## 2023-01-05 PROCEDURE — 80053 COMPREHEN METABOLIC PANEL: CPT | Performed by: FAMILY MEDICINE

## 2023-01-05 PROCEDURE — 84466 ASSAY OF TRANSFERRIN: CPT | Performed by: FAMILY MEDICINE

## 2023-01-05 PROCEDURE — 80061 LIPID PANEL: CPT | Performed by: FAMILY MEDICINE

## 2023-01-05 PROCEDURE — 83540 ASSAY OF IRON: CPT | Performed by: FAMILY MEDICINE

## 2023-01-05 PROCEDURE — 84153 ASSAY OF PSA TOTAL: CPT | Performed by: FAMILY MEDICINE

## 2023-01-05 PROCEDURE — 82607 VITAMIN B-12: CPT | Performed by: FAMILY MEDICINE

## 2023-01-05 PROCEDURE — 84443 ASSAY THYROID STIM HORMONE: CPT | Performed by: FAMILY MEDICINE

## 2023-01-05 PROCEDURE — 83036 HEMOGLOBIN GLYCOSYLATED A1C: CPT | Performed by: FAMILY MEDICINE

## 2023-01-05 NOTE — PROGRESS NOTES
Subjective   Chief Complaint   Patient presents with   • Hypertension     6 month follow up   • Hyperlipidemia   • Anemia   • Prediabetes     Antonio De Dios is a 67 y.o. male.     Patient Care Team:  Smiley Lu MD as PCP - General (Family Medicine)    History of Present Illness  He is coming in today to follow-up on his chronic medical problems and his medications including hypertension, hyperlipidemia, prediabetes, anemia, and BPH.  He reports taking his medicines as directed and denies any issues or side effects.  His last set of blood work was done in 12/2021, the blood work was then ordered again in 6/2022, but patient did not get it done.  He is not up to speed with his colonoscopy, he has been refusing colonoscopy over the years and he did not follow through with a Cologuard either.  He prefers not to get this type of testing done. Patient does not report any chest pain, shortness of breath, dizziness, nausea, vomiting, or diarrhea, visual issues, headaches, numbness or tingling. No urinary issues reported like urgency, frequency, or discomfort upon urination.  No significant weight changes reported.  No swelling reported.  No rashes or any other skin issues reported. No emotional issues or insomnia.       The following portions of the patient's history were reviewed and updated as appropriate: allergies, current medications, past family history, past medical history, past social history, past surgical history and problem list.  Past Medical History:   Diagnosis Date   • Asthma    • BPH (benign prostatic hyperplasia)    • Elevated PSA    • GERD (gastroesophageal reflux disease)    • Hyperlipidemia    • Hypertension    • Prediabetes    • Vitamin D deficiency      Past Surgical History:   Procedure Laterality Date   • COLONOSCOPY  2008   • TONSILLECTOMY     • UMBILICAL HERNIA REPAIR  07/2018   • VASECTOMY       The patient has a family history of  Family History   Problem Relation Age of Onset   • Diabetes  Mother    • Heart disease Mother    • Hypertension Mother    • Heart disease Father      Social History     Socioeconomic History   • Marital status:    Tobacco Use   • Smoking status: Never   • Smokeless tobacco: Never   Substance and Sexual Activity   • Alcohol use: Yes   • Drug use: No   • Sexual activity: Not Currently     Partners: Female       Review of Systems   Constitutional: Negative for activity change, fatigue and fever.   HENT: Positive for congestion.    Respiratory: Negative for cough, shortness of breath and wheezing.    Cardiovascular: Negative for chest pain, palpitations and leg swelling.   Gastrointestinal: Negative for constipation, diarrhea and indigestion.   Skin: Negative for color change, dry skin and rash.   Neurological: Negative for tremors and headache.     Visit Vitals  /88 (BP Location: Left arm, Patient Position: Sitting, Cuff Size: Adult)   Pulse 108   Temp 97.1 °F (36.2 °C) (Temporal)   Resp 16   Ht 177.8 cm (70\")   Wt 87.1 kg (192 lb)   SpO2 95%   BMI 27.55 kg/m²       Current Outpatient Medications:   •  albuterol sulfate HFA (ProAir HFA) 108 (90 Base) MCG/ACT inhaler, Inhale 2 puffs 4 (Four) Times a Day., Disp: 8 g, Rfl: 1  •  aspirin 81 MG EC tablet, ASPIR-LOW 81 MG TBEC, Disp: , Rfl:   •  atorvastatin (LIPITOR) 40 MG tablet, TAKE 1 TABLET BY MOUTH EVERYDAY AT BEDTIME, Disp: 90 tablet, Rfl: 1  •  Cholecalciferol (VITAMIN D) 50 MCG (2000 UT) capsule, VITAMIN D 2000 UNIT CAPS, Disp: , Rfl:   •  ferrous sulfate 325 (65 FE) MG tablet, TAKE 1 TABLET BY MOUTH EVERY DAY WITH BREAKFAST, Disp: 90 tablet, Rfl: 0  •  lisinopril (PRINIVIL,ZESTRIL) 40 MG tablet, TAKE 1 TABLET BY MOUTH EVERY DAY, Disp: 90 tablet, Rfl: 1  •  metFORMIN ER (GLUCOPHAGE-XR) 500 MG 24 hr tablet, TAKE 1 TABLET BY MOUTH EVERY DAY WITH BREAKFAST, Disp: 90 tablet, Rfl: 1    Objective   Physical Exam  Vitals and nursing note reviewed.   Constitutional:       General: He is not in acute distress.      Appearance: Normal appearance. He is well-developed. He is not ill-appearing.   HENT:      Head: Normocephalic and atraumatic.   Cardiovascular:      Rate and Rhythm: Normal rate and regular rhythm.      Heart sounds: Normal heart sounds. No murmur heard.    No gallop.   Pulmonary:      Effort: Pulmonary effort is normal. No respiratory distress.      Breath sounds: Normal breath sounds. No wheezing, rhonchi or rales.   Chest:      Chest wall: No tenderness.   Musculoskeletal:      Cervical back: Normal range of motion and neck supple.   Neurological:      General: No focal deficit present.      Mental Status: He is alert and oriented to person, place, and time. Mental status is at baseline.   Psychiatric:         Mood and Affect: Mood normal.       Assessment & Plan   Diagnoses and all orders for this visit:    1. Primary hypertension (Primary)  -     Comprehensive Metabolic Panel  -     Lipid Panel    2. Mixed hyperlipidemia  -     Comprehensive Metabolic Panel  -     Lipid Panel  -     TSH    3. Prediabetes  -     Hemoglobin A1c    4. Benign prostatic hyperplasia without lower urinary tract symptoms  -     PSA DIAGNOSTIC; Future  -     Urinalysis With Culture If Indicated - Urine, Clean Catch  -     PSA DIAGNOSTIC    5. Stage 3a chronic kidney disease (HCC)  -     CBC Auto Differential  -     Comprehensive Metabolic Panel  -     TSH    6. Iron deficiency anemia, unspecified iron deficiency anemia type  -     CBC Auto Differential  -     Iron Profile  -     Vitamin B12      I reviewed his medical problems and his medications.  I also reviewed his blood work results from 12/2021.  Patient was supposed to get repeat blood work done in 6/2022, but he failed to do so.  Repeat fasting labs are being done today.  I once again reinforced with the patient importance of colon cancer screening especially in view of iron deficiency anemia which was noted on the blood work in 12/2021, he is refusing to proceed with  colonoscopy and even with a Cologuard at this point.  I will advise further once the repeat blood work results are available.  Compliance with medication and healthy lifestyle was reinforced.  I also advised for him to keep up with his eye exams.        Return in about 6 months (around 7/5/2023) for Medicare Wellness.    Requested Prescriptions      No prescriptions requested or ordered in this encounter

## 2023-01-06 RX ORDER — FERROUS SULFATE 325(65) MG
1 TABLET ORAL
Qty: 90 TABLET | Refills: 0 | Status: SHIPPED | OUTPATIENT
Start: 2023-01-06

## 2023-01-06 NOTE — PROGRESS NOTES
Patient  was notified and verbally understood. He again declines getting a colonoscopy. \" He said he will worry about that at another time\"  But I did also stress the importance , he still declined

## 2023-06-07 DIAGNOSIS — I10 ESSENTIAL HYPERTENSION: ICD-10-CM

## 2023-06-07 DIAGNOSIS — R73.03 PREDIABETES: ICD-10-CM

## 2023-06-07 DIAGNOSIS — E78.2 MIXED HYPERLIPIDEMIA: ICD-10-CM

## 2023-06-07 RX ORDER — ATORVASTATIN CALCIUM 40 MG/1
TABLET, FILM COATED ORAL
Qty: 90 TABLET | Refills: 0 | Status: SHIPPED | OUTPATIENT
Start: 2023-06-07

## 2023-06-07 RX ORDER — METFORMIN HYDROCHLORIDE 500 MG/1
TABLET, EXTENDED RELEASE ORAL
Qty: 90 TABLET | Refills: 0 | Status: SHIPPED | OUTPATIENT
Start: 2023-06-07

## 2023-06-07 RX ORDER — LISINOPRIL 40 MG/1
TABLET ORAL
Qty: 90 TABLET | Refills: 0 | Status: SHIPPED | OUTPATIENT
Start: 2023-06-07

## 2023-09-07 DIAGNOSIS — R73.03 PREDIABETES: ICD-10-CM

## 2023-09-07 DIAGNOSIS — E78.2 MIXED HYPERLIPIDEMIA: ICD-10-CM

## 2023-09-07 DIAGNOSIS — I10 ESSENTIAL HYPERTENSION: ICD-10-CM

## 2023-09-07 RX ORDER — METFORMIN HYDROCHLORIDE 500 MG/1
TABLET, EXTENDED RELEASE ORAL
Qty: 90 TABLET | Refills: 0 | Status: SHIPPED | OUTPATIENT
Start: 2023-09-07

## 2023-09-07 RX ORDER — ATORVASTATIN CALCIUM 40 MG/1
TABLET, FILM COATED ORAL
Qty: 90 TABLET | Refills: 0 | Status: SHIPPED | OUTPATIENT
Start: 2023-09-07

## 2023-09-07 RX ORDER — LISINOPRIL 40 MG/1
TABLET ORAL
Qty: 90 TABLET | Refills: 0 | Status: SHIPPED | OUTPATIENT
Start: 2023-09-07

## 2023-12-04 DIAGNOSIS — I10 ESSENTIAL HYPERTENSION: ICD-10-CM

## 2023-12-04 DIAGNOSIS — R73.03 PREDIABETES: ICD-10-CM

## 2023-12-04 DIAGNOSIS — E78.2 MIXED HYPERLIPIDEMIA: ICD-10-CM

## 2023-12-04 RX ORDER — LISINOPRIL 40 MG/1
TABLET ORAL
Qty: 90 TABLET | Refills: 0 | Status: SHIPPED | OUTPATIENT
Start: 2023-12-04

## 2023-12-04 RX ORDER — ATORVASTATIN CALCIUM 40 MG/1
TABLET, FILM COATED ORAL
Qty: 90 TABLET | Refills: 0 | Status: SHIPPED | OUTPATIENT
Start: 2023-12-04

## 2023-12-04 RX ORDER — METFORMIN HYDROCHLORIDE 500 MG/1
TABLET, EXTENDED RELEASE ORAL
Qty: 90 TABLET | Refills: 0 | Status: SHIPPED | OUTPATIENT
Start: 2023-12-04

## 2024-01-18 ENCOUNTER — OFFICE VISIT (OUTPATIENT)
Dept: FAMILY MEDICINE CLINIC | Facility: CLINIC | Age: 69
End: 2024-01-18
Payer: MEDICARE

## 2024-01-18 ENCOUNTER — LAB (OUTPATIENT)
Dept: FAMILY MEDICINE CLINIC | Facility: CLINIC | Age: 69
End: 2024-01-18
Payer: MEDICARE

## 2024-01-18 VITALS
RESPIRATION RATE: 16 BRPM | DIASTOLIC BLOOD PRESSURE: 98 MMHG | WEIGHT: 196.6 LBS | BODY MASS INDEX: 28.15 KG/M2 | TEMPERATURE: 98.4 F | OXYGEN SATURATION: 95 % | HEIGHT: 70 IN | SYSTOLIC BLOOD PRESSURE: 133 MMHG | HEART RATE: 102 BPM

## 2024-01-18 DIAGNOSIS — J45.20 MILD INTERMITTENT ASTHMA, UNSPECIFIED WHETHER COMPLICATED: ICD-10-CM

## 2024-01-18 DIAGNOSIS — N40.0 BENIGN PROSTATIC HYPERPLASIA WITHOUT LOWER URINARY TRACT SYMPTOMS: ICD-10-CM

## 2024-01-18 DIAGNOSIS — E78.2 MIXED HYPERLIPIDEMIA: ICD-10-CM

## 2024-01-18 DIAGNOSIS — Z12.11 COLON CANCER SCREENING: ICD-10-CM

## 2024-01-18 DIAGNOSIS — Z00.00 MEDICARE ANNUAL WELLNESS VISIT, SUBSEQUENT: Primary | ICD-10-CM

## 2024-01-18 DIAGNOSIS — E11.9 TYPE 2 DIABETES MELLITUS WITHOUT COMPLICATION, WITHOUT LONG-TERM CURRENT USE OF INSULIN: ICD-10-CM

## 2024-01-18 LAB
ALBUMIN SERPL-MCNC: 4.3 G/DL (ref 3.5–5.2)
ALBUMIN UR-MCNC: 1.6 MG/DL
ALBUMIN/GLOB SERPL: 1.3 G/DL
ALP SERPL-CCNC: 123 U/L (ref 39–117)
ALT SERPL W P-5'-P-CCNC: 31 U/L (ref 1–41)
ANION GAP SERPL CALCULATED.3IONS-SCNC: 20.1 MMOL/L (ref 5–15)
AST SERPL-CCNC: 23 U/L (ref 1–40)
BASOPHILS # BLD AUTO: 0.08 10*3/MM3 (ref 0–0.2)
BASOPHILS NFR BLD AUTO: 0.6 % (ref 0–1.5)
BILIRUB SERPL-MCNC: 0.6 MG/DL (ref 0–1.2)
BILIRUB UR QL STRIP: NEGATIVE
BUN SERPL-MCNC: 12 MG/DL (ref 8–23)
BUN/CREAT SERPL: 10.5 (ref 7–25)
CALCIUM SPEC-SCNC: 9.9 MG/DL (ref 8.6–10.5)
CHLORIDE SERPL-SCNC: 98 MMOL/L (ref 98–107)
CHOLEST SERPL-MCNC: 133 MG/DL (ref 0–200)
CLARITY UR: CLEAR
CO2 SERPL-SCNC: 18.9 MMOL/L (ref 22–29)
COLOR UR: YELLOW
CREAT SERPL-MCNC: 1.14 MG/DL (ref 0.76–1.27)
CREAT UR-MCNC: 150 MG/DL
DEPRECATED RDW RBC AUTO: 40.7 FL (ref 37–54)
EGFRCR SERPLBLD CKD-EPI 2021: 69.6 ML/MIN/1.73
EOSINOPHIL # BLD AUTO: 0.11 10*3/MM3 (ref 0–0.4)
EOSINOPHIL NFR BLD AUTO: 0.8 % (ref 0.3–6.2)
ERYTHROCYTE [DISTWIDTH] IN BLOOD BY AUTOMATED COUNT: 14.2 % (ref 12.3–15.4)
GLOBULIN UR ELPH-MCNC: 3.4 GM/DL
GLUCOSE SERPL-MCNC: 106 MG/DL (ref 65–99)
GLUCOSE UR STRIP-MCNC: NEGATIVE MG/DL
HBA1C MFR BLD: 6.7 % (ref 4.8–5.6)
HCT VFR BLD AUTO: 47.2 % (ref 37.5–51)
HDLC SERPL-MCNC: 52 MG/DL (ref 40–60)
HGB BLD-MCNC: 14.7 G/DL (ref 13–17.7)
HGB UR QL STRIP.AUTO: NEGATIVE
HOLD SPECIMEN: NORMAL
IMM GRANULOCYTES # BLD AUTO: 0.06 10*3/MM3 (ref 0–0.05)
IMM GRANULOCYTES NFR BLD AUTO: 0.4 % (ref 0–0.5)
KETONES UR QL STRIP: NEGATIVE
LDLC SERPL CALC-MCNC: 65 MG/DL (ref 0–100)
LDLC/HDLC SERPL: 1.23 {RATIO}
LEUKOCYTE ESTERASE UR QL STRIP.AUTO: NEGATIVE
LYMPHOCYTES # BLD AUTO: 2.01 10*3/MM3 (ref 0.7–3.1)
LYMPHOCYTES NFR BLD AUTO: 15 % (ref 19.6–45.3)
MCH RBC QN AUTO: 25.1 PG (ref 26.6–33)
MCHC RBC AUTO-ENTMCNC: 31.1 G/DL (ref 31.5–35.7)
MCV RBC AUTO: 80.5 FL (ref 79–97)
MICROALBUMIN/CREAT UR: 10.7 MG/G (ref 0–29)
MONOCYTES # BLD AUTO: 1.1 10*3/MM3 (ref 0.1–0.9)
MONOCYTES NFR BLD AUTO: 8.2 % (ref 5–12)
NEUTROPHILS NFR BLD AUTO: 10.07 10*3/MM3 (ref 1.7–7)
NEUTROPHILS NFR BLD AUTO: 75 % (ref 42.7–76)
NITRITE UR QL STRIP: NEGATIVE
NRBC BLD AUTO-RTO: 0 /100 WBC (ref 0–0.2)
PH UR STRIP.AUTO: 6.5 [PH] (ref 5–8)
PLATELET # BLD AUTO: 362 10*3/MM3 (ref 140–450)
PMV BLD AUTO: 10.3 FL (ref 6–12)
POTASSIUM SERPL-SCNC: 3.9 MMOL/L (ref 3.5–5.2)
PROT SERPL-MCNC: 7.7 G/DL (ref 6–8.5)
PROT UR QL STRIP: NEGATIVE
PSA SERPL-MCNC: 2.09 NG/ML (ref 0–4)
RBC # BLD AUTO: 5.86 10*6/MM3 (ref 4.14–5.8)
SODIUM SERPL-SCNC: 137 MMOL/L (ref 136–145)
SP GR UR STRIP: 1.02 (ref 1–1.03)
TRIGL SERPL-MCNC: 85 MG/DL (ref 0–150)
UROBILINOGEN UR QL STRIP: NORMAL
VLDLC SERPL-MCNC: 16 MG/DL (ref 5–40)
WBC NRBC COR # BLD AUTO: 13.43 10*3/MM3 (ref 3.4–10.8)

## 2024-01-18 PROCEDURE — 83036 HEMOGLOBIN GLYCOSYLATED A1C: CPT | Performed by: FAMILY MEDICINE

## 2024-01-18 PROCEDURE — 84153 ASSAY OF PSA TOTAL: CPT | Performed by: FAMILY MEDICINE

## 2024-01-18 PROCEDURE — 80053 COMPREHEN METABOLIC PANEL: CPT | Performed by: FAMILY MEDICINE

## 2024-01-18 PROCEDURE — 82043 UR ALBUMIN QUANTITATIVE: CPT | Performed by: FAMILY MEDICINE

## 2024-01-18 PROCEDURE — 82570 ASSAY OF URINE CREATININE: CPT | Performed by: FAMILY MEDICINE

## 2024-01-18 PROCEDURE — 80061 LIPID PANEL: CPT | Performed by: FAMILY MEDICINE

## 2024-01-18 PROCEDURE — 81003 URINALYSIS AUTO W/O SCOPE: CPT | Performed by: FAMILY MEDICINE

## 2024-01-18 PROCEDURE — 36415 COLL VENOUS BLD VENIPUNCTURE: CPT | Performed by: FAMILY MEDICINE

## 2024-01-18 PROCEDURE — 85025 COMPLETE CBC W/AUTO DIFF WBC: CPT | Performed by: FAMILY MEDICINE

## 2024-01-18 RX ORDER — ALBUTEROL SULFATE 90 UG/1
2 AEROSOL, METERED RESPIRATORY (INHALATION)
Qty: 18 G | Refills: 1 | Status: SHIPPED | OUTPATIENT
Start: 2024-01-18

## 2024-01-18 NOTE — PROGRESS NOTES
Subsequent Medicare Wellness Visit   The ABC's of the Annual Wellness Visit    Chief Complaint   Patient presents with    Hyperlipidemia    Hypertension    Medicare Wellness-subsequent       HPI:  Antonio De Dios, -1955, is a 68 y.o. male who presents for a Subsequent Medicare Wellness Visit.  He lives by himself, he is fully independent with his activities of daily living.  He enjoys playing golf, but now it simply too cold for it.  No issues with depression or memory problems are being reported.  No frequent falls.  He has been dealing with asthma for pretty much all of his adult life, he uses albuterol as needed, his symptoms get typically triggered with cold weather and he would like to get refill on albuterol inhaler.  Patient does not report any chest pain, shortness of breath, dizziness, nausea, vomiting, or diarrhea, visual issues, headaches, numbness or tingling. No urinary issues reported like urgency, frequency, or discomfort upon urination.  No significant weight changes reported.  No swelling reported.  No rashes or any other skin issues reported. No emotional issues or insomnia.    Recent Hospitalizations:  No hospitalization(s) within the last year..    Current Medical Providers:  Patient Care Team:  Smiley Lu MD as PCP - General (Family Medicine)    Health Habits and Functional and Cognitive Screening and Depression Screenin/18/2024     7:00 AM   Functional & Cognitive Status   Do you have difficulty preparing food and eating? No   Do you have difficulty bathing yourself, getting dressed or grooming yourself? No   Do you have difficulty using the toilet? No   Do you have difficulty moving around from place to place? No   Do you have trouble with steps or getting out of a bed or a chair? No   Current Diet Well Balanced Diet   Dental Exam Up to date   Eye Exam Up to date   Exercise (times per week) 0 times per week   Current Exercises Include No Regular Exercise   Do you need help  using the phone?  No   Are you deaf or do you have serious difficulty hearing?  No   Do you need help to go to places out of walking distance? No   Do you need help shopping? No   Do you need help preparing meals?  No   Do you need help with housework?  No   Do you need help with laundry? No   Do you need help taking your medications? No   Do you need help managing money? No   Do you ever drive or ride in a car without wearing a seat belt? No   Have you felt unusual stress, anger or loneliness in the last month? No   Who do you live with? Alone   If you need help, do you have trouble finding someone available to you? No   Do you have difficulty concentrating, remembering or making decisions? No       Compared to one year ago, the patient feels his physical health is the same and his mental health is the same.    Depression Screen:      1/18/2024     7:51 AM   PHQ-2/PHQ-9 Depression Screening   Little Interest or Pleasure in Doing Things 0-->not at all   Feeling Down, Depressed or Hopeless 0-->not at all   PHQ-9: Brief Depression Severity Measure Score 0         Past Medical/Family/Social History:  The following portions of the patient's history were reviewed and updated as appropriate: allergies, current medications, past family history, past medical history, past social history, past surgical history, and problem list.    Allergies   Allergen Reactions    Hydrochlorothiazide Dizziness         Current Outpatient Medications:     albuterol sulfate HFA (ProAir HFA) 108 (90 Base) MCG/ACT inhaler, Inhale 2 puffs 4 (Four) Times a Day., Disp: 18 g, Rfl: 1    aspirin 81 MG EC tablet, ASPIR-LOW 81 MG TBEC, Disp: , Rfl:     atorvastatin (LIPITOR) 40 MG tablet, TAKE 1 TABLET BY MOUTH EVERYDAY AT BEDTIME, Disp: 90 tablet, Rfl: 0    Cholecalciferol (VITAMIN D) 50 MCG (2000 UT) capsule, VITAMIN D 2000 UNIT CAPS, Disp: , Rfl:     ferrous sulfate 325 (65 FE) MG tablet, Take 1 tablet by mouth Daily With Breakfast., Disp: 90 tablet,  Rfl: 0    lisinopril (PRINIVIL,ZESTRIL) 40 MG tablet, TAKE 1 TABLET BY MOUTH EVERY DAY, Disp: 90 tablet, Rfl: 0    metFORMIN ER (GLUCOPHAGE-XR) 500 MG 24 hr tablet, TAKE 1 TABLET BY MOUTH EVERY DAY WITH BREAKFAST, Disp: 90 tablet, Rfl: 0    Aspirin use counseling: Taking ASA appropriately as indicated    Current medication list contains no high risk medications.  No harmful drug interactions have been identified.     Family History   Problem Relation Age of Onset    Diabetes Mother     Heart disease Mother     Hypertension Mother     Heart disease Father        Social History     Tobacco Use    Smoking status: Never    Smokeless tobacco: Never   Substance Use Topics    Alcohol use: Yes       Past Surgical History:   Procedure Laterality Date    COLONOSCOPY  2008    TONSILLECTOMY      UMBILICAL HERNIA REPAIR  07/2018    VASECTOMY         Patient Active Problem List   Diagnosis    Asthma    Benign prostatic hyperplasia    Cardiomegaly    Gastroesophageal reflux disease    Hiatal hernia    High prostate specific antigen (PSA)    Hyperlipidemia    Hypertension    Prediabetes    Vitamin D deficiency    Colon cancer screening    Stage 3a chronic kidney disease    Medicare annual wellness visit, subsequent    Iron deficiency anemia       Review of Systems   Constitutional:  Negative for activity change, fatigue and fever.   Respiratory:  Negative for shortness of breath and wheezing.    Cardiovascular:  Negative for chest pain, palpitations and leg swelling.   Endocrine: Negative for cold intolerance, heat intolerance, polydipsia and polyphagia.   Musculoskeletal:  Negative for arthralgias and back pain.   Skin:  Negative for rash.   Neurological:  Negative for tremors and headache.       Objective     Vitals:    01/18/24 0755   BP: 133/98   BP Location: Left arm   Patient Position: Sitting   Cuff Size: Large Adult   Pulse: 102   Resp: 16   Temp: 98.4 °F (36.9 °C)   TempSrc: Infrared   SpO2: 95%   Weight: 89.2 kg (196 lb  "9.6 oz)   Height: 177.8 cm (70\")   PainSc: 0-No pain              No results found.    The patient has no evidence of cognitve impairment.     Physical Exam  Vitals and nursing note reviewed.   Constitutional:       General: He is not in acute distress.     Appearance: Normal appearance. He is well-developed. He is not ill-appearing.   HENT:      Head: Normocephalic and atraumatic.   Cardiovascular:      Rate and Rhythm: Normal rate and regular rhythm.      Heart sounds: Normal heart sounds. No murmur heard.     No gallop.   Pulmonary:      Effort: Pulmonary effort is normal. No respiratory distress.      Breath sounds: Normal breath sounds. No wheezing, rhonchi or rales.   Chest:      Chest wall: No tenderness.   Musculoskeletal:      Cervical back: Normal range of motion and neck supple.   Neurological:      General: No focal deficit present.      Mental Status: He is alert and oriented to person, place, and time. Mental status is at baseline.   Psychiatric:         Mood and Affect: Mood normal.         Recent Lab Results:     Lab Results   Component Value Date    CHOL 134 07/18/2023    TRIG 115 07/18/2023    HDL 50 07/18/2023    VLDL 21 07/18/2023    LDLHDL 1.22 07/18/2023       Assessment & Plan   Age-appropriate Screening Schedule:  Refer to the list below for future screening recommendations based on patient's age, sex and/or medical conditions.      Health Maintenance   Topic Date Due    ZOSTER VACCINE (2 of 3) 11/28/2017    DIABETIC FOOT EXAM  Never done    HEMOGLOBIN A1C  01/18/2024    COLORECTAL CANCER SCREENING  01/18/2024    LIPID PANEL  07/18/2024    URINE MICROALBUMIN  07/18/2024    BMI FOLLOWUP  07/18/2024    DIABETIC EYE EXAM  09/01/2024    ANNUAL WELLNESS VISIT  01/18/2025    TDAP/TD VACCINES (2 - Td or Tdap) 12/04/2025    HEPATITIS C SCREENING  Completed    COVID-19 Vaccine  Completed    INFLUENZA VACCINE  Completed    Pneumococcal Vaccine 65+  Completed       Medicare Risks and Personalized Health " Plan:  Advance Directive Discussion  Cardiovascular risk  Chronic Pain   Colon Cancer Screening  Dementia/Memory   Depression/Dysphoria  Diabetic Lab Screening   Fall Risk  Immunizations Discussed/Encouraged (specific immunizations; COVID19 and RSV (Respiratory Syncytial Virus) )  Obesity/Overweight   Prostate Cancer Screening       CMS-Preventive Services Quick Reference  Medicare Preventive Services Addressed:  Annual Wellness Visit (AWV)  Cardiovascular Disease Screening Tests (may do this order every 5 years in beneficiaries without signs or symptoms of cardiovascular disease)  Colorectal Cancer Screening, Colonoscopy  Prostate Cancer Screening     Advance Care Planning:  ACP discussion was held with the patient during this visit. Patient has an advance directive in EMR which is still valid.     Diagnoses and all orders for this visit:    1. Medicare annual wellness visit, subsequent (Primary)    2. Colon cancer screening  -     Cologuard - Stool, Per Rectum; Future    3. Type 2 diabetes mellitus without complication, without long-term current use of insulin  -     Comprehensive Metabolic Panel  -     Hemoglobin A1c  -     Microalbumin / Creatinine Urine Ratio - Urine, Clean Catch  -     Lipid Panel  -     CBC Auto Differential    4. Mixed hyperlipidemia  -     Comprehensive Metabolic Panel  -     Lipid Panel  -     CBC Auto Differential    5. Benign prostatic hyperplasia without lower urinary tract symptoms  -     PSA DIAGNOSTIC; Future  -     Urinalysis With Culture If Indicated - Urine, Clean Catch  -     [place] Urine Culture Tube - Urine, Clean Catch    6. Mild intermittent asthma, unspecified whether complicated  -     albuterol sulfate HFA (ProAir HFA) 108 (90 Base) MCG/ACT inhaler; Inhale 2 puffs 4 (Four) Times a Day.  Dispense: 18 g; Refill: 1      Medicare wellness exam was done today.  I will be getting fasting blood work.  I also reviewed his health maintenance.  He is not up to speed with his colon  cancer screening, patient does not want to get colonoscopy.  He previously was given order for Cologuard, but stool was not collected.  Colon cancer screening was again discussed and reinforced and the new order for Cologuard was given.  I also reviewed his immunization.  He is up to speed with his vaccination if it comes to flu shot, pneumonia shot, and COVID-19 shot.  I advised the patient to schedule shingles shot and RSV through the pharmacy.  Healthy lifestyle was reinforced.    An After Visit Summary and PPPS with all of these plans were given to the patient.      Follow Up:  Return in about 6 months (around 7/18/2024) for Next scheduled follow up.        Requested Prescriptions     Signed Prescriptions Disp Refills    albuterol sulfate HFA (ProAir HFA) 108 (90 Base) MCG/ACT inhaler 18 g 1     Sig: Inhale 2 puffs 4 (Four) Times a Day.

## 2024-01-19 DIAGNOSIS — D72.829 LEUKOCYTOSIS, UNSPECIFIED TYPE: Primary | ICD-10-CM

## 2024-01-22 ENCOUNTER — TELEPHONE (OUTPATIENT)
Dept: ONCOLOGY | Facility: CLINIC | Age: 69
End: 2024-01-22
Payer: MEDICARE

## 2024-01-26 ENCOUNTER — TELEPHONE (OUTPATIENT)
Dept: ONCOLOGY | Facility: CLINIC | Age: 69
End: 2024-01-26
Payer: MEDICARE

## 2024-01-30 ENCOUNTER — TELEPHONE (OUTPATIENT)
Dept: ONCOLOGY | Facility: CLINIC | Age: 69
End: 2024-01-30
Payer: MEDICARE

## 2024-02-02 ENCOUNTER — TELEPHONE (OUTPATIENT)
Dept: ONCOLOGY | Facility: CLINIC | Age: 69
End: 2024-02-02
Payer: MEDICARE

## 2024-03-01 DIAGNOSIS — I10 ESSENTIAL HYPERTENSION: ICD-10-CM

## 2024-03-01 DIAGNOSIS — E78.2 MIXED HYPERLIPIDEMIA: ICD-10-CM

## 2024-03-01 DIAGNOSIS — R73.03 PREDIABETES: ICD-10-CM

## 2024-03-01 RX ORDER — ATORVASTATIN CALCIUM 40 MG/1
TABLET, FILM COATED ORAL
Qty: 90 TABLET | Refills: 0 | Status: SHIPPED | OUTPATIENT
Start: 2024-03-01

## 2024-03-01 RX ORDER — LISINOPRIL 40 MG/1
TABLET ORAL
Qty: 90 TABLET | Refills: 0 | Status: SHIPPED | OUTPATIENT
Start: 2024-03-01

## 2024-03-01 RX ORDER — METFORMIN HYDROCHLORIDE 500 MG/1
TABLET, EXTENDED RELEASE ORAL
Qty: 90 TABLET | Refills: 0 | Status: SHIPPED | OUTPATIENT
Start: 2024-03-01

## 2024-03-26 ENCOUNTER — OFFICE VISIT (OUTPATIENT)
Dept: FAMILY MEDICINE CLINIC | Facility: CLINIC | Age: 69
End: 2024-03-26
Payer: MEDICARE

## 2024-03-26 VITALS
BODY MASS INDEX: 27.35 KG/M2 | HEART RATE: 123 BPM | RESPIRATION RATE: 16 BRPM | TEMPERATURE: 98.2 F | WEIGHT: 191 LBS | DIASTOLIC BLOOD PRESSURE: 79 MMHG | SYSTOLIC BLOOD PRESSURE: 116 MMHG | HEIGHT: 70 IN

## 2024-03-26 DIAGNOSIS — H61.21 IMPACTED CERUMEN OF RIGHT EAR: ICD-10-CM

## 2024-03-26 DIAGNOSIS — H66.002 NON-RECURRENT ACUTE SUPPURATIVE OTITIS MEDIA OF LEFT EAR WITHOUT SPONTANEOUS RUPTURE OF TYMPANIC MEMBRANE: ICD-10-CM

## 2024-03-26 DIAGNOSIS — J06.9 ACUTE URI: Primary | ICD-10-CM

## 2024-03-26 DIAGNOSIS — R00.0 SINUS TACHYCARDIA: ICD-10-CM

## 2024-03-26 LAB
EXPIRATION DATE: NORMAL
INTERNAL CONTROL: NORMAL
Lab: NORMAL
SARS-COV-2 AG UPPER RESP QL IA.RAPID: NOT DETECTED

## 2024-03-26 RX ORDER — AMOXICILLIN AND CLAVULANATE POTASSIUM 875; 125 MG/1; MG/1
1 TABLET, FILM COATED ORAL 2 TIMES DAILY
Qty: 20 TABLET | Refills: 0 | Status: SHIPPED | OUTPATIENT
Start: 2024-03-26

## 2024-03-26 NOTE — PROGRESS NOTES
Subjective   Chief Complaint   Patient presents with    Nasal Congestion    Cough    Ear Fullness    Fever     Since Thursday     Antonio De Dios is a 68 y.o. male.     Patient Care Team:  Smiley Lu MD as PCP - General (Family Medicine)    History of Present Illness  He is coming in today due to upper respiratory symptoms which he started experiencing 5-6 days ago.  He reports having some drainage and congestion and some cough.  He just felt it was his typical seasonal allergies.  He later on started noticing some hearing issues bilaterally, he also has some pressure in both of his ears, but he denies any pain.  He also had some low-grade fever around 101 last week, but that has resolved.  He also reports that about 3 days ago he started taking regularly over-the-counter decongestant every 4 hours.  His last dose was last night.  On further questioning he tells me that he started feeling somehow more shaky since he started taking the decongestant.       The following portions of the patient's history were reviewed and updated as appropriate: allergies, current medications, past family history, past medical history, past social history, past surgical history, and problem list.  Past Medical History:   Diagnosis Date    Asthma     BPH (benign prostatic hyperplasia)     Elevated PSA     GERD (gastroesophageal reflux disease)     Hyperlipidemia     Hypertension     Prediabetes     Vitamin D deficiency      Past Surgical History:   Procedure Laterality Date    COLONOSCOPY  2008    TONSILLECTOMY      UMBILICAL HERNIA REPAIR  07/2018    VASECTOMY       The patient has a family history of  Family History   Problem Relation Age of Onset    Diabetes Mother     Heart disease Mother     Hypertension Mother     Heart disease Father      Social History     Socioeconomic History    Marital status:    Tobacco Use    Smoking status: Never     Passive exposure: Never    Smokeless tobacco: Never   Vaping Use    Vaping  "status: Never Used   Substance and Sexual Activity    Alcohol use: Yes    Drug use: No    Sexual activity: Not Currently     Partners: Female       Review of Systems   Constitutional:  Positive for fever. Negative for activity change, appetite change and chills.   HENT:  Positive for congestion, ear pain, hearing loss and postnasal drip. Negative for sinus pressure, sore throat and swollen glands.    Respiratory:  Positive for cough. Negative for choking, chest tightness, shortness of breath, wheezing and stridor.    Cardiovascular:  Negative for chest pain.   Skin:  Negative for dry skin and rash.     Visit Vitals  /79 (BP Location: Left arm, Patient Position: Sitting, Cuff Size: Adult)   Pulse (!) 123   Temp 98.2 °F (36.8 °C) (Infrared)   Resp 16   Ht 177.8 cm (70\")   Wt 86.6 kg (191 lb)   BMI 27.41 kg/m²              Current Outpatient Medications:     albuterol sulfate HFA (ProAir HFA) 108 (90 Base) MCG/ACT inhaler, Inhale 2 puffs 4 (Four) Times a Day., Disp: 18 g, Rfl: 1    aspirin 81 MG EC tablet, ASPIR-LOW 81 MG TBEC, Disp: , Rfl:     atorvastatin (LIPITOR) 40 MG tablet, TAKE 1 TABLET BY MOUTH EVERYDAY AT BEDTIME, Disp: 90 tablet, Rfl: 0    Cholecalciferol (VITAMIN D) 50 MCG (2000 UT) capsule, VITAMIN D 2000 UNIT CAPS, Disp: , Rfl:     ferrous sulfate 325 (65 FE) MG tablet, Take 1 tablet by mouth Daily With Breakfast., Disp: 90 tablet, Rfl: 0    lisinopril (PRINIVIL,ZESTRIL) 40 MG tablet, TAKE 1 TABLET BY MOUTH EVERY DAY, Disp: 90 tablet, Rfl: 0    metFORMIN ER (GLUCOPHAGE-XR) 500 MG 24 hr tablet, TAKE 1 TABLET BY MOUTH EVERY DAY WITH BREAKFAST, Disp: 90 tablet, Rfl: 0    amoxicillin-clavulanate (AUGMENTIN) 875-125 MG per tablet, Take 1 tablet by mouth 2 (Two) Times a Day., Disp: 20 tablet, Rfl: 0    carbamide peroxide (Debrox) 6.5 % otic solution, Administer 5 drops to the right ear 2 (Two) Times a Day., Disp: 15 mL, Rfl: 0    Objective   Physical Exam  Vitals and nursing note reviewed. "   Constitutional:       General: He is not in acute distress.     Appearance: He is well-developed.   HENT:      Head: Normocephalic and atraumatic.      Comments: Left tympanic membrane is inflamed with cloudy fluid behind it.  No earwax noted in the left ear canal.  The right ear canal is noted to have cerumen impaction.     Right Ear: External ear normal.      Left Ear: External ear normal.      Mouth/Throat:      Pharynx: No oropharyngeal exudate.   Eyes:      Conjunctiva/sclera: Conjunctivae normal.      Pupils: Pupils are equal, round, and reactive to light.   Cardiovascular:      Rate and Rhythm: Normal rate and regular rhythm.      Heart sounds: Normal heart sounds.   Pulmonary:      Effort: Pulmonary effort is normal. No respiratory distress.      Breath sounds: Normal breath sounds. No wheezing or rales.   Musculoskeletal:      Cervical back: Normal range of motion and neck supple.   Skin:     General: Skin is warm and dry.      Findings: No rash.       Ear Cerumen Removal    Date/Time: 3/26/2024 10:05 AM    Performed by: Smiley Lu MD  Authorized by: Smiley Lu MD    Anesthesia:  Local Anesthetic: none  Location details: right ear  Comments: Earwax removed from the right ear canal without any difficulty using lightened ear curette.  Patient reported resolution of his right ear symptoms after that.  Procedure type: instrumentation, curette       ECG 12 Lead    Date/Time: 3/26/2024 10:28 AM  Performed by: Smiley Lu MD    Authorized by: Smiley Lu MD  Comparison: compared with previous ECG from 1/17/2020  Similar to previous ECG  Rhythm: sinus rhythm  Rate: tachycardic  Conduction: non-specific intraventricular conduction delay  ST Segments: ST segments normal  T Waves: T waves normal  QRS axis: right    Clinical impression: abnormal EKG          Assessment & Plan   Diagnoses and all orders for this visit:    1. Acute URI (Primary)  -     POCT RICO SARS-CoV-2 Antigen LINDA    2.  Non-recurrent acute suppurative otitis media of left ear without spontaneous rupture of tympanic membrane  -     amoxicillin-clavulanate (AUGMENTIN) 875-125 MG per tablet; Take 1 tablet by mouth 2 (Two) Times a Day.  Dispense: 20 tablet; Refill: 0    3. Sinus tachycardia  -     ECG 12 Lead    4. Impacted cerumen of right ear  -     Ear Cerumen Removal  -     carbamide peroxide (Debrox) 6.5 % otic solution; Administer 5 drops to the right ear 2 (Two) Times a Day.  Dispense: 15 mL; Refill: 0    Other orders  -     ECG Scan      His rapid COVID-19 test was negative today.  I will be starting him on antibiotic to treat his left-sided otitis media.  I also addressed his sinus tachycardia, EKG was done.  Patient has been taking rather too much over-the-counter decongestants lately and I advised for him to completely discontinue and monitor the symptoms and call us back with update in 48 hours or sooner if needed.  If his tachycardia is persistent even after discontinuation of decongestants he will need further evaluation.  I also addressed his cerumen impaction which was noted in the right ear.  This was completely removed using lightened ear curette.  See procedure note for details.  There was still small amount of earwax noted deeper in the right ear canal and I gave him prescription for Debrox to start using now.        Return if symptoms worsen or fail to improve, for Recheck.    Requested Prescriptions     Signed Prescriptions Disp Refills    amoxicillin-clavulanate (AUGMENTIN) 875-125 MG per tablet 20 tablet 0     Sig: Take 1 tablet by mouth 2 (Two) Times a Day.    carbamide peroxide (Debrox) 6.5 % otic solution 15 mL 0     Sig: Administer 5 drops to the right ear 2 (Two) Times a Day.

## 2024-07-18 ENCOUNTER — LAB (OUTPATIENT)
Dept: FAMILY MEDICINE CLINIC | Facility: CLINIC | Age: 69
End: 2024-07-18
Payer: MEDICARE

## 2024-07-18 ENCOUNTER — OFFICE VISIT (OUTPATIENT)
Dept: FAMILY MEDICINE CLINIC | Facility: CLINIC | Age: 69
End: 2024-07-18
Payer: MEDICARE

## 2024-07-18 VITALS
RESPIRATION RATE: 16 BRPM | HEIGHT: 70 IN | SYSTOLIC BLOOD PRESSURE: 156 MMHG | WEIGHT: 192 LBS | HEART RATE: 102 BPM | BODY MASS INDEX: 27.49 KG/M2 | OXYGEN SATURATION: 95 % | TEMPERATURE: 99.7 F | DIASTOLIC BLOOD PRESSURE: 89 MMHG

## 2024-07-18 DIAGNOSIS — E78.2 MIXED HYPERLIPIDEMIA: ICD-10-CM

## 2024-07-18 DIAGNOSIS — N40.0 BENIGN PROSTATIC HYPERPLASIA WITHOUT LOWER URINARY TRACT SYMPTOMS: ICD-10-CM

## 2024-07-18 DIAGNOSIS — E11.9 TYPE 2 DIABETES MELLITUS WITHOUT COMPLICATION, WITHOUT LONG-TERM CURRENT USE OF INSULIN: Primary | ICD-10-CM

## 2024-07-18 DIAGNOSIS — I10 ESSENTIAL HYPERTENSION: ICD-10-CM

## 2024-07-18 DIAGNOSIS — D72.829 LEUKOCYTOSIS, UNSPECIFIED TYPE: Primary | ICD-10-CM

## 2024-07-18 DIAGNOSIS — D72.829 LEUKOCYTOSIS, UNSPECIFIED TYPE: ICD-10-CM

## 2024-07-18 PROBLEM — H66.002 NON-RECURRENT ACUTE SUPPURATIVE OTITIS MEDIA OF LEFT EAR WITHOUT SPONTANEOUS RUPTURE OF TYMPANIC MEMBRANE: Status: RESOLVED | Noted: 2024-03-26 | Resolved: 2024-07-18

## 2024-07-18 PROBLEM — J06.9 ACUTE URI: Status: RESOLVED | Noted: 2024-03-26 | Resolved: 2024-07-18

## 2024-07-18 PROBLEM — H61.21 IMPACTED CERUMEN OF RIGHT EAR: Status: RESOLVED | Noted: 2024-03-26 | Resolved: 2024-07-18

## 2024-07-18 LAB
ALBUMIN SERPL-MCNC: 4.2 G/DL (ref 3.5–5.2)
ALBUMIN/GLOB SERPL: 1.3 G/DL
ALP SERPL-CCNC: 124 U/L (ref 39–117)
ALT SERPL W P-5'-P-CCNC: 28 U/L (ref 1–41)
ANION GAP SERPL CALCULATED.3IONS-SCNC: 11 MMOL/L (ref 5–15)
AST SERPL-CCNC: 25 U/L (ref 1–40)
BASOPHILS # BLD AUTO: 0.07 10*3/MM3 (ref 0–0.2)
BASOPHILS NFR BLD AUTO: 0.6 % (ref 0–1.5)
BILIRUB SERPL-MCNC: 0.4 MG/DL (ref 0–1.2)
BUN SERPL-MCNC: 14 MG/DL (ref 8–23)
BUN/CREAT SERPL: 12.2 (ref 7–25)
CALCIUM SPEC-SCNC: 9.7 MG/DL (ref 8.6–10.5)
CHLORIDE SERPL-SCNC: 102 MMOL/L (ref 98–107)
CHOLEST SERPL-MCNC: 121 MG/DL (ref 0–200)
CO2 SERPL-SCNC: 24 MMOL/L (ref 22–29)
CREAT SERPL-MCNC: 1.15 MG/DL (ref 0.76–1.27)
DEPRECATED RDW RBC AUTO: 45 FL (ref 37–54)
EGFRCR SERPLBLD CKD-EPI 2021: 68.5 ML/MIN/1.73
EOSINOPHIL # BLD AUTO: 0.17 10*3/MM3 (ref 0–0.4)
EOSINOPHIL NFR BLD AUTO: 1.4 % (ref 0.3–6.2)
ERYTHROCYTE [DISTWIDTH] IN BLOOD BY AUTOMATED COUNT: 15.7 % (ref 12.3–15.4)
GLOBULIN UR ELPH-MCNC: 3.3 GM/DL
GLUCOSE SERPL-MCNC: 116 MG/DL (ref 65–99)
HBA1C MFR BLD: 6.5 % (ref 4.8–5.6)
HCT VFR BLD AUTO: 43.6 % (ref 37.5–51)
HDLC SERPL-MCNC: 47 MG/DL (ref 40–60)
HGB BLD-MCNC: 13.6 G/DL (ref 13–17.7)
IMM GRANULOCYTES # BLD AUTO: 0.05 10*3/MM3 (ref 0–0.05)
IMM GRANULOCYTES NFR BLD AUTO: 0.4 % (ref 0–0.5)
LDLC SERPL CALC-MCNC: 55 MG/DL (ref 0–100)
LDLC/HDLC SERPL: 1.13 {RATIO}
LYMPHOCYTES # BLD AUTO: 2 10*3/MM3 (ref 0.7–3.1)
LYMPHOCYTES NFR BLD AUTO: 16.2 % (ref 19.6–45.3)
MCH RBC QN AUTO: 24.8 PG (ref 26.6–33)
MCHC RBC AUTO-ENTMCNC: 31.2 G/DL (ref 31.5–35.7)
MCV RBC AUTO: 79.4 FL (ref 79–97)
MONOCYTES # BLD AUTO: 1.01 10*3/MM3 (ref 0.1–0.9)
MONOCYTES NFR BLD AUTO: 8.2 % (ref 5–12)
NEUTROPHILS NFR BLD AUTO: 73.2 % (ref 42.7–76)
NEUTROPHILS NFR BLD AUTO: 9.03 10*3/MM3 (ref 1.7–7)
NRBC BLD AUTO-RTO: 0 /100 WBC (ref 0–0.2)
PLATELET # BLD AUTO: 342 10*3/MM3 (ref 140–450)
PMV BLD AUTO: 10.4 FL (ref 6–12)
POTASSIUM SERPL-SCNC: 4.8 MMOL/L (ref 3.5–5.2)
PROT SERPL-MCNC: 7.5 G/DL (ref 6–8.5)
PSA SERPL-MCNC: 2.16 NG/ML (ref 0–4)
RBC # BLD AUTO: 5.49 10*6/MM3 (ref 4.14–5.8)
SODIUM SERPL-SCNC: 137 MMOL/L (ref 136–145)
TRIGL SERPL-MCNC: 104 MG/DL (ref 0–150)
VLDLC SERPL-MCNC: 19 MG/DL (ref 5–40)
WBC NRBC COR # BLD AUTO: 12.33 10*3/MM3 (ref 3.4–10.8)

## 2024-07-18 PROCEDURE — 3044F HG A1C LEVEL LT 7.0%: CPT | Performed by: FAMILY MEDICINE

## 2024-07-18 PROCEDURE — 3077F SYST BP >= 140 MM HG: CPT | Performed by: FAMILY MEDICINE

## 2024-07-18 PROCEDURE — 1126F AMNT PAIN NOTED NONE PRSNT: CPT | Performed by: FAMILY MEDICINE

## 2024-07-18 PROCEDURE — 99214 OFFICE O/P EST MOD 30 MIN: CPT | Performed by: FAMILY MEDICINE

## 2024-07-18 PROCEDURE — 85025 COMPLETE CBC W/AUTO DIFF WBC: CPT | Performed by: FAMILY MEDICINE

## 2024-07-18 PROCEDURE — 83036 HEMOGLOBIN GLYCOSYLATED A1C: CPT | Performed by: FAMILY MEDICINE

## 2024-07-18 PROCEDURE — 3079F DIAST BP 80-89 MM HG: CPT | Performed by: FAMILY MEDICINE

## 2024-07-18 PROCEDURE — G2211 COMPLEX E/M VISIT ADD ON: HCPCS | Performed by: FAMILY MEDICINE

## 2024-07-18 PROCEDURE — 80053 COMPREHEN METABOLIC PANEL: CPT | Performed by: FAMILY MEDICINE

## 2024-07-18 PROCEDURE — 80061 LIPID PANEL: CPT | Performed by: FAMILY MEDICINE

## 2024-07-18 PROCEDURE — 36415 COLL VENOUS BLD VENIPUNCTURE: CPT | Performed by: FAMILY MEDICINE

## 2024-07-18 PROCEDURE — 84153 ASSAY OF PSA TOTAL: CPT | Performed by: FAMILY MEDICINE

## 2024-07-18 RX ORDER — METFORMIN HYDROCHLORIDE 500 MG/1
500 TABLET, EXTENDED RELEASE ORAL
Qty: 90 TABLET | Refills: 1 | Status: SHIPPED | OUTPATIENT
Start: 2024-07-18

## 2024-07-18 RX ORDER — MULTIPLE VITAMINS W/ MINERALS TAB 9MG-400MCG
1 TAB ORAL DAILY
COMMUNITY

## 2024-07-18 RX ORDER — ATORVASTATIN CALCIUM 40 MG/1
40 TABLET, FILM COATED ORAL
Qty: 90 TABLET | Refills: 1 | Status: SHIPPED | OUTPATIENT
Start: 2024-07-18

## 2024-07-18 RX ORDER — LISINOPRIL 40 MG/1
40 TABLET ORAL DAILY
Qty: 90 TABLET | Refills: 1 | Status: SHIPPED | OUTPATIENT
Start: 2024-07-18

## 2024-07-18 NOTE — PROGRESS NOTES
Subjective   Chief Complaint   Patient presents with    Follow-up    Diabetes    Hyperlipidemia    Hypertension    Med Refill    Benign Prostatic Hypertrophy     Antonio De Dios is a 69 y.o. male.     Patient Care Team:  Smiley Lu MD as PCP - General (Family Medicine)    History of Present Illness  He is coming in today to follow-up on his chronic medical problems and his medications including diabetes, hypertension, hyperlipidemia, BPH, and asthma.  Patient reports taking his medications as directed, but he has not taken his blood pressure medicine today in the morning.  And his blood pressure is noted to be elevated.  Patient was previously referred to see the hematologist due to leukocytosis, but he has not scheduled appointment yet.  Patient also tells me that he prefers not really see the specialists as he overall feels well and does not want to keep looking for a problem as long as he feels fine.  He will be leaving town tomorrow morning and he is flying out to [place] to explore the [facility], he will be gone for about 2 weeks and he is looking forward to that trip.       The following portions of the patient's history were reviewed and updated as appropriate: allergies, current medications, past family history, past medical history, past social history, past surgical history, and problem list.  Past Medical History:   Diagnosis Date    Asthma     BPH (benign prostatic hyperplasia)     Elevated PSA     GERD (gastroesophageal reflux disease)     Hyperlipidemia     Hypertension     Prediabetes     Vitamin D deficiency      Past Surgical History:   Procedure Laterality Date    COLONOSCOPY  2008    TONSILLECTOMY      UMBILICAL HERNIA REPAIR  07/2018    VASECTOMY       The patient has a family history of  Family History   Problem Relation Age of Onset    Diabetes Mother     Heart disease Mother     Hypertension Mother     Heart disease Father      Social History     Socioeconomic History    Marital  "status:    Tobacco Use    Smoking status: Never     Passive exposure: Never    Smokeless tobacco: Never   Vaping Use    Vaping status: Never Used   Substance and Sexual Activity    Alcohol use: Yes    Drug use: No    Sexual activity: Not Currently     Partners: Female       Review of Systems   Constitutional:  Negative for activity change, fatigue and fever.   Respiratory:  Negative for cough, shortness of breath and wheezing.    Cardiovascular:  Negative for chest pain, palpitations and leg swelling.   Gastrointestinal:  Negative for constipation, diarrhea and indigestion.   Endocrine: Negative for polydipsia and polyphagia.   Skin:  Negative for color change, dry skin and rash.   Neurological:  Negative for tremors and headache.     Visit Vitals  /89 (BP Location: Left arm, Patient Position: Sitting, Cuff Size: Adult) Comment: Has not had meds today   Pulse 102   Temp 99.7 °F (37.6 °C) (Infrared)   Resp 16   Ht 177.8 cm (70\")   Wt 87.1 kg (192 lb)   SpO2 95%   BMI 27.55 kg/m²       BMI is >= 25 and <30. (Overweight) The following options were offered after discussion;: exercise counseling/recommendations      Current Outpatient Medications:     albuterol sulfate HFA (ProAir HFA) 108 (90 Base) MCG/ACT inhaler, Inhale 2 puffs 4 (Four) Times a Day., Disp: 18 g, Rfl: 1    aspirin 81 MG EC tablet, ASPIR-LOW 81 MG TBEC, Disp: , Rfl:     atorvastatin (LIPITOR) 40 MG tablet, Take 1 tablet by mouth every night at bedtime., Disp: 90 tablet, Rfl: 1    Cholecalciferol (VITAMIN D) 50 MCG (2000 UT) capsule, VITAMIN D 2000 UNIT CAPS, Disp: , Rfl:     lisinopril (PRINIVIL,ZESTRIL) 40 MG tablet, Take 1 tablet by mouth Daily., Disp: 90 tablet, Rfl: 1    metFORMIN ER (GLUCOPHAGE-XR) 500 MG 24 hr tablet, Take 1 tablet by mouth Daily With Breakfast., Disp: 90 tablet, Rfl: 1    multivitamin with minerals tablet tablet, Take 1 tablet by mouth Daily., Disp: , Rfl:     Objective   Physical Exam  Vitals and nursing note " reviewed.   Constitutional:       General: He is not in acute distress.     Appearance: Normal appearance. He is well-developed. He is not ill-appearing.   HENT:      Head: Normocephalic and atraumatic.   Cardiovascular:      Rate and Rhythm: Normal rate and regular rhythm.      Heart sounds: Normal heart sounds. No murmur heard.     No gallop.   Pulmonary:      Effort: Pulmonary effort is normal. No respiratory distress.      Breath sounds: Normal breath sounds. No wheezing, rhonchi or rales.   Chest:      Chest wall: No tenderness.   Musculoskeletal:      Cervical back: Normal range of motion and neck supple.   Neurological:      General: No focal deficit present.      Mental Status: He is alert and oriented to person, place, and time. Mental status is at baseline.   Psychiatric:         Mood and Affect: Mood normal.         FOLLOWING LABS WERE REVIEWED TODAY:  CMP          1/18/2024    08:22   CMP   Glucose 106    BUN 12    Creatinine 1.14    EGFR 69.6    Sodium 137    Potassium 3.9    Chloride 98    Calcium 9.9    Total Protein 7.7    Albumin 4.3    Globulin 3.4    Total Bilirubin 0.6    Alkaline Phosphatase 123    AST (SGOT) 23    ALT (SGPT) 31    Albumin/Globulin Ratio 1.3    BUN/Creatinine Ratio 10.5    Anion Gap 20.1      CBC          1/18/2024    08:22   CBC   WBC 13.43    RBC 5.86    Hemoglobin 14.7    Hematocrit 47.2    MCV 80.5    MCH 25.1    MCHC 31.1    RDW 14.2    Platelets 362      Lipid Panel          1/18/2024    08:22   Lipid Panel   Total Cholesterol 133    Triglycerides 85    HDL Cholesterol 52    VLDL Cholesterol 16    LDL Cholesterol  65    LDL/HDL Ratio 1.23      Most Recent A1C          1/18/2024    08:22   HGBA1C Most Recent   Hemoglobin A1C 6.70      PSA          1/18/2024    08:22   PSA   PSA 2.090          Assessment & Plan   Diagnoses and all orders for this visit:    1. Type 2 diabetes mellitus without complication, without long-term current use of insulin (Primary)  -      Comprehensive Metabolic Panel  -     Hemoglobin A1c  -     Lipid Panel  -     metFORMIN ER (GLUCOPHAGE-XR) 500 MG 24 hr tablet; Take 1 tablet by mouth Daily With Breakfast.  Dispense: 90 tablet; Refill: 1    2. Mixed hyperlipidemia  -     Comprehensive Metabolic Panel  -     Lipid Panel  -     atorvastatin (LIPITOR) 40 MG tablet; Take 1 tablet by mouth every night at bedtime.  Dispense: 90 tablet; Refill: 1    3. Essential hypertension  -     lisinopril (PRINIVIL,ZESTRIL) 40 MG tablet; Take 1 tablet by mouth Daily.  Dispense: 90 tablet; Refill: 1    4. Leukocytosis, unspecified type  -     CBC Auto Differential    5. Benign prostatic hyperplasia without lower urinary tract symptoms  -     PSA DIAGNOSTIC; Future      I reviewed his medical problems and his medications.  I also reviewed his previous labs and the new set of fasting blood work is being done today including CBC.  Patient is skeptical about seeing specialist for further evaluations of some abnormalities noted on his blood work, I discussed with the patient that sometimes problems are noted early on without really given any symptoms to the patient's, but even at that stage further evaluation is indicated.  Compliance with medication was discussed and reinforced, his blood pressure is noted to be elevated today, but he has not taken his medications yet.        Return in about 6 months (around 1/18/2025) for Medicare Wellness.    Requested Prescriptions     Signed Prescriptions Disp Refills    metFORMIN ER (GLUCOPHAGE-XR) 500 MG 24 hr tablet 90 tablet 1     Sig: Take 1 tablet by mouth Daily With Breakfast.    lisinopril (PRINIVIL,ZESTRIL) 40 MG tablet 90 tablet 1     Sig: Take 1 tablet by mouth Daily.    atorvastatin (LIPITOR) 40 MG tablet 90 tablet 1     Sig: Take 1 tablet by mouth every night at bedtime.       Smiley Lu MD  07/18/2024

## 2025-01-15 DIAGNOSIS — I10 ESSENTIAL HYPERTENSION: ICD-10-CM

## 2025-01-15 DIAGNOSIS — E11.9 TYPE 2 DIABETES MELLITUS WITHOUT COMPLICATION, WITHOUT LONG-TERM CURRENT USE OF INSULIN: ICD-10-CM

## 2025-01-15 DIAGNOSIS — E78.2 MIXED HYPERLIPIDEMIA: ICD-10-CM

## 2025-01-15 RX ORDER — LISINOPRIL 40 MG/1
40 TABLET ORAL DAILY
Qty: 90 TABLET | Refills: 0 | Status: SHIPPED | OUTPATIENT
Start: 2025-01-15

## 2025-01-15 RX ORDER — ATORVASTATIN CALCIUM 40 MG/1
40 TABLET, FILM COATED ORAL
Qty: 90 TABLET | Refills: 0 | Status: SHIPPED | OUTPATIENT
Start: 2025-01-15

## 2025-01-15 RX ORDER — METFORMIN HYDROCHLORIDE 500 MG/1
500 TABLET, EXTENDED RELEASE ORAL
Qty: 90 TABLET | Refills: 0 | Status: SHIPPED | OUTPATIENT
Start: 2025-01-15

## 2025-04-17 DIAGNOSIS — E78.2 MIXED HYPERLIPIDEMIA: ICD-10-CM

## 2025-04-17 DIAGNOSIS — I10 ESSENTIAL HYPERTENSION: ICD-10-CM

## 2025-04-17 DIAGNOSIS — E11.9 TYPE 2 DIABETES MELLITUS WITHOUT COMPLICATION, WITHOUT LONG-TERM CURRENT USE OF INSULIN: ICD-10-CM

## 2025-04-17 RX ORDER — ATORVASTATIN CALCIUM 40 MG/1
40 TABLET, FILM COATED ORAL
Qty: 90 TABLET | Refills: 0 | Status: SHIPPED | OUTPATIENT
Start: 2025-04-17

## 2025-04-17 RX ORDER — LISINOPRIL 40 MG/1
40 TABLET ORAL DAILY
Qty: 90 TABLET | Refills: 0 | Status: SHIPPED | OUTPATIENT
Start: 2025-04-17

## 2025-04-17 RX ORDER — METFORMIN HYDROCHLORIDE 500 MG/1
500 TABLET, EXTENDED RELEASE ORAL
Qty: 90 TABLET | Refills: 0 | Status: SHIPPED | OUTPATIENT
Start: 2025-04-17

## 2025-07-16 DIAGNOSIS — E11.9 TYPE 2 DIABETES MELLITUS WITHOUT COMPLICATION, WITHOUT LONG-TERM CURRENT USE OF INSULIN: ICD-10-CM

## 2025-07-16 DIAGNOSIS — I10 ESSENTIAL HYPERTENSION: ICD-10-CM

## 2025-07-16 DIAGNOSIS — E78.2 MIXED HYPERLIPIDEMIA: ICD-10-CM

## 2025-07-16 RX ORDER — LISINOPRIL 40 MG/1
40 TABLET ORAL DAILY
Qty: 90 TABLET | Refills: 0 | Status: SHIPPED | OUTPATIENT
Start: 2025-07-16

## 2025-07-16 RX ORDER — METFORMIN HYDROCHLORIDE 500 MG/1
500 TABLET, EXTENDED RELEASE ORAL
Qty: 90 TABLET | Refills: 0 | Status: SHIPPED | OUTPATIENT
Start: 2025-07-16

## 2025-07-16 RX ORDER — ATORVASTATIN CALCIUM 40 MG/1
40 TABLET, FILM COATED ORAL
Qty: 90 TABLET | Refills: 0 | Status: SHIPPED | OUTPATIENT
Start: 2025-07-16